# Patient Record
Sex: FEMALE | Race: WHITE | Employment: OTHER | URBAN - METROPOLITAN AREA
[De-identification: names, ages, dates, MRNs, and addresses within clinical notes are randomized per-mention and may not be internally consistent; named-entity substitution may affect disease eponyms.]

---

## 2020-01-25 ENCOUNTER — APPOINTMENT (OUTPATIENT)
Dept: CT IMAGING | Age: 85
DRG: 313 | End: 2020-01-25
Payer: MEDICARE

## 2020-01-25 ENCOUNTER — APPOINTMENT (OUTPATIENT)
Dept: GENERAL RADIOLOGY | Age: 85
DRG: 313 | End: 2020-01-25
Payer: MEDICARE

## 2020-01-25 ENCOUNTER — HOSPITAL ENCOUNTER (INPATIENT)
Age: 85
LOS: 3 days | Discharge: NURSING, REHAB OR LONG-TERM CARE | DRG: 313 | End: 2020-01-28
Attending: EMERGENCY MEDICINE | Admitting: INTERNAL MEDICINE
Payer: MEDICARE
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
## 2020-01-25 PROBLEM — R07.9 CHEST PAIN: Status: ACTIVE | Noted: 2020-01-25

## 2020-01-25 PROBLEM — I50.32 CHRONIC DIASTOLIC CONGESTIVE HEART FAILURE (HCC): Status: ACTIVE | Noted: 2020-01-25

## 2020-01-25 PROBLEM — R77.8 TROPONIN I ABOVE REFERENCE RANGE: Status: ACTIVE | Noted: 2020-01-25

## 2020-01-25 PROBLEM — E03.8 OTHER SPECIFIED HYPOTHYROIDISM: Status: ACTIVE | Noted: 2020-01-25

## 2020-01-25 LAB
ABSOLUTE EOS #: 0.13 K/UL (ref 0–0.44)
ABSOLUTE IMMATURE GRANULOCYTE: 0.09 K/UL (ref 0–0.3)
ABSOLUTE LYMPH #: 1.49 K/UL (ref 1.1–3.7)
ABSOLUTE MONO #: 0.81 K/UL (ref 0.1–1.2)
ANION GAP SERPL CALCULATED.3IONS-SCNC: 11 MMOL/L (ref 9–17)
BASOPHILS # BLD: 1 % (ref 0–2)
BASOPHILS ABSOLUTE: 0.05 K/UL (ref 0–0.2)
BNP INTERPRETATION: ABNORMAL
BUN BLDV-MCNC: 24 MG/DL (ref 8–23)
BUN/CREAT BLD: 21 (ref 9–20)
CALCIUM SERPL-MCNC: 9.7 MG/DL (ref 8.6–10.4)
CHLORIDE BLD-SCNC: 107 MMOL/L (ref 98–107)
CO2: 21 MMOL/L (ref 20–31)
CREAT SERPL-MCNC: 1.12 MG/DL (ref 0.5–0.9)
DIFFERENTIAL TYPE: ABNORMAL
EOSINOPHILS RELATIVE PERCENT: 1 % (ref 1–4)
GFR AFRICAN AMERICAN: 55 ML/MIN
GFR NON-AFRICAN AMERICAN: 46 ML/MIN
GFR SERPL CREATININE-BSD FRML MDRD: ABNORMAL ML/MIN/{1.73_M2}
GFR SERPL CREATININE-BSD FRML MDRD: ABNORMAL ML/MIN/{1.73_M2}
GLUCOSE BLD-MCNC: 118 MG/DL (ref 70–99)
HCT VFR BLD CALC: 40.7 % (ref 36.3–47.1)
HEMOGLOBIN: 12.1 G/DL (ref 11.9–15.1)
IMMATURE GRANULOCYTES: 1 %
LYMPHOCYTES # BLD: 14 % (ref 24–43)
MAGNESIUM: 2.1 MG/DL (ref 1.6–2.6)
MCH RBC QN AUTO: 28.2 PG (ref 25.2–33.5)
MCHC RBC AUTO-ENTMCNC: 29.7 G/DL (ref 28.4–34.8)
MCV RBC AUTO: 94.9 FL (ref 82.6–102.9)
MONOCYTES # BLD: 8 % (ref 3–12)
MYOGLOBIN: 277 NG/ML (ref 25–58)
MYOGLOBIN: 296 NG/ML (ref 25–58)
NRBC AUTOMATED: 0 PER 100 WBC
PDW BLD-RTO: 17 % (ref 11.8–14.4)
PLATELET # BLD: 136 K/UL (ref 138–453)
PLATELET ESTIMATE: ABNORMAL
PMV BLD AUTO: 11.7 FL (ref 8.1–13.5)
POTASSIUM SERPL-SCNC: 4.4 MMOL/L (ref 3.7–5.3)
PRO-BNP: 779 PG/ML
RBC # BLD: 4.29 M/UL (ref 3.95–5.11)
RBC # BLD: ABNORMAL 10*6/UL
SEG NEUTROPHILS: 75 % (ref 36–65)
SEGMENTED NEUTROPHILS ABSOLUTE COUNT: 7.92 K/UL (ref 1.5–8.1)
SODIUM BLD-SCNC: 139 MMOL/L (ref 135–144)
TOTAL CK: 103 U/L (ref 26–192)
TOTAL CK: 110 U/L (ref 26–192)
TROPONIN INTERP: ABNORMAL
TROPONIN T: ABNORMAL NG/ML
TROPONIN, HIGH SENSITIVITY: 30 NG/L (ref 0–14)
TROPONIN, HIGH SENSITIVITY: 30 NG/L (ref 0–14)
TROPONIN, HIGH SENSITIVITY: 31 NG/L (ref 0–14)
WBC # BLD: 10.5 K/UL (ref 3.5–11.3)
WBC # BLD: ABNORMAL 10*3/UL

## 2020-01-25 PROCEDURE — 6360000002 HC RX W HCPCS: Performed by: NURSE PRACTITIONER

## 2020-01-25 PROCEDURE — 96376 TX/PRO/DX INJ SAME DRUG ADON: CPT

## 2020-01-25 PROCEDURE — 6370000000 HC RX 637 (ALT 250 FOR IP): Performed by: NURSE PRACTITIONER

## 2020-01-25 PROCEDURE — 2580000003 HC RX 258: Performed by: NURSE PRACTITIONER

## 2020-01-25 PROCEDURE — 84484 ASSAY OF TROPONIN QUANT: CPT

## 2020-01-25 PROCEDURE — 1200000000 HC SEMI PRIVATE

## 2020-01-25 PROCEDURE — 71250 CT THORAX DX C-: CPT

## 2020-01-25 PROCEDURE — 72125 CT NECK SPINE W/O DYE: CPT

## 2020-01-25 PROCEDURE — 83874 ASSAY OF MYOGLOBIN: CPT

## 2020-01-25 PROCEDURE — 74176 CT ABD & PELVIS W/O CONTRAST: CPT

## 2020-01-25 PROCEDURE — 99222 1ST HOSP IP/OBS MODERATE 55: CPT | Performed by: NURSE PRACTITIONER

## 2020-01-25 PROCEDURE — 99291 CRITICAL CARE FIRST HOUR: CPT

## 2020-01-25 PROCEDURE — 71045 X-RAY EXAM CHEST 1 VIEW: CPT

## 2020-01-25 PROCEDURE — 82550 ASSAY OF CK (CPK): CPT

## 2020-01-25 PROCEDURE — 72170 X-RAY EXAM OF PELVIS: CPT

## 2020-01-25 PROCEDURE — 96374 THER/PROPH/DIAG INJ IV PUSH: CPT

## 2020-01-25 PROCEDURE — 93005 ELECTROCARDIOGRAM TRACING: CPT | Performed by: NURSE PRACTITIONER

## 2020-01-25 PROCEDURE — 36415 COLL VENOUS BLD VENIPUNCTURE: CPT

## 2020-01-25 PROCEDURE — 85025 COMPLETE CBC W/AUTO DIFF WBC: CPT

## 2020-01-25 PROCEDURE — 83735 ASSAY OF MAGNESIUM: CPT

## 2020-01-25 PROCEDURE — 70450 CT HEAD/BRAIN W/O DYE: CPT

## 2020-01-25 PROCEDURE — 83880 ASSAY OF NATRIURETIC PEPTIDE: CPT

## 2020-01-25 PROCEDURE — 80048 BASIC METABOLIC PNL TOTAL CA: CPT

## 2020-01-25 RX ORDER — MAGNESIUM SULFATE 1 G/100ML
1 INJECTION INTRAVENOUS PRN
Status: DISCONTINUED | OUTPATIENT
Start: 2020-01-25 | End: 2020-01-28 | Stop reason: HOSPADM

## 2020-01-25 RX ORDER — POTASSIUM CHLORIDE 7.45 MG/ML
10 INJECTION INTRAVENOUS PRN
Status: DISCONTINUED | OUTPATIENT
Start: 2020-01-25 | End: 2020-01-26 | Stop reason: SDUPTHER

## 2020-01-25 RX ORDER — ASPIRIN 81 MG/1
324 TABLET, CHEWABLE ORAL ONCE
Status: COMPLETED | OUTPATIENT
Start: 2020-01-25 | End: 2020-01-25

## 2020-01-25 RX ORDER — ONDANSETRON 2 MG/ML
4 INJECTION INTRAMUSCULAR; INTRAVENOUS EVERY 6 HOURS PRN
Status: DISCONTINUED | OUTPATIENT
Start: 2020-01-25 | End: 2020-01-26 | Stop reason: SDUPTHER

## 2020-01-25 RX ORDER — FENTANYL CITRATE 50 UG/ML
25 INJECTION, SOLUTION INTRAMUSCULAR; INTRAVENOUS ONCE
Status: COMPLETED | OUTPATIENT
Start: 2020-01-25 | End: 2020-01-25

## 2020-01-25 RX ORDER — DILTIAZEM HYDROCHLORIDE 120 MG/1
120 CAPSULE, EXTENDED RELEASE ORAL DAILY
Status: DISCONTINUED | OUTPATIENT
Start: 2020-01-26 | End: 2020-01-26 | Stop reason: CLARIF

## 2020-01-25 RX ORDER — ACETAMINOPHEN 325 MG/1
650 TABLET ORAL EVERY 4 HOURS PRN
Status: DISCONTINUED | OUTPATIENT
Start: 2020-01-25 | End: 2020-01-28 | Stop reason: HOSPADM

## 2020-01-25 RX ORDER — M-VIT,TX,IRON,MINS/CALC/FOLIC 27MG-0.4MG
1 TABLET ORAL DAILY
Status: DISCONTINUED | OUTPATIENT
Start: 2020-01-26 | End: 2020-01-28 | Stop reason: HOSPADM

## 2020-01-25 RX ORDER — LEVOTHYROXINE SODIUM 0.1 MG/1
100 TABLET ORAL DAILY
Status: DISCONTINUED | OUTPATIENT
Start: 2020-01-26 | End: 2020-01-28 | Stop reason: HOSPADM

## 2020-01-25 RX ORDER — SODIUM CHLORIDE 0.9 % (FLUSH) 0.9 %
10 SYRINGE (ML) INJECTION PRN
Status: DISCONTINUED | OUTPATIENT
Start: 2020-01-25 | End: 2020-01-28 | Stop reason: HOSPADM

## 2020-01-25 RX ORDER — POTASSIUM CHLORIDE 7.45 MG/ML
10 INJECTION INTRAVENOUS PRN
Status: DISCONTINUED | OUTPATIENT
Start: 2020-01-25 | End: 2020-01-28 | Stop reason: HOSPADM

## 2020-01-25 RX ORDER — ASPIRIN 81 MG/1
81 TABLET ORAL DAILY
Status: DISCONTINUED | OUTPATIENT
Start: 2020-01-26 | End: 2020-01-28 | Stop reason: HOSPADM

## 2020-01-25 RX ORDER — OXYBUTYNIN CHLORIDE 5 MG/1
5 TABLET ORAL 2 TIMES DAILY
Status: DISCONTINUED | OUTPATIENT
Start: 2020-01-25 | End: 2020-01-28 | Stop reason: HOSPADM

## 2020-01-25 RX ORDER — OXYCODONE HYDROCHLORIDE AND ACETAMINOPHEN 5; 325 MG/1; MG/1
1 TABLET ORAL EVERY 8 HOURS PRN
Status: ON HOLD | COMMUNITY
End: 2020-01-28 | Stop reason: SDUPTHER

## 2020-01-25 RX ORDER — PRAVASTATIN SODIUM 40 MG
40 TABLET ORAL DAILY
Status: DISCONTINUED | OUTPATIENT
Start: 2020-01-26 | End: 2020-01-28 | Stop reason: HOSPADM

## 2020-01-25 RX ORDER — DORZOLAMIDE HCL 20 MG/ML
1 SOLUTION/ DROPS OPHTHALMIC 3 TIMES DAILY
Status: DISCONTINUED | OUTPATIENT
Start: 2020-01-25 | End: 2020-01-28 | Stop reason: HOSPADM

## 2020-01-25 RX ORDER — 0.9 % SODIUM CHLORIDE 0.9 %
250 INTRAVENOUS SOLUTION INTRAVENOUS ONCE
Status: COMPLETED | OUTPATIENT
Start: 2020-01-25 | End: 2020-01-25

## 2020-01-25 RX ORDER — CALCIUM CARBONATE 500(1250)
600 TABLET ORAL DAILY
Status: DISCONTINUED | OUTPATIENT
Start: 2020-01-26 | End: 2020-01-26 | Stop reason: CLARIF

## 2020-01-25 RX ORDER — VITAMIN B COMPLEX
1000 TABLET ORAL DAILY
Status: DISCONTINUED | OUTPATIENT
Start: 2020-01-26 | End: 2020-01-28 | Stop reason: HOSPADM

## 2020-01-25 RX ORDER — POTASSIUM CHLORIDE 20 MEQ/1
20 TABLET, EXTENDED RELEASE ORAL
Status: DISCONTINUED | OUTPATIENT
Start: 2020-01-26 | End: 2020-01-28 | Stop reason: HOSPADM

## 2020-01-25 RX ORDER — SODIUM CHLORIDE 0.9 % (FLUSH) 0.9 %
10 SYRINGE (ML) INJECTION EVERY 12 HOURS SCHEDULED
Status: DISCONTINUED | OUTPATIENT
Start: 2020-01-25 | End: 2020-01-28 | Stop reason: HOSPADM

## 2020-01-25 RX ORDER — NITROGLYCERIN 0.4 MG/1
0.4 TABLET SUBLINGUAL EVERY 5 MIN PRN
Status: DISCONTINUED | OUTPATIENT
Start: 2020-01-25 | End: 2020-01-28 | Stop reason: HOSPADM

## 2020-01-25 RX ORDER — CLOPIDOGREL BISULFATE 75 MG/1
75 TABLET ORAL DAILY
Status: DISCONTINUED | OUTPATIENT
Start: 2020-01-26 | End: 2020-01-28 | Stop reason: HOSPADM

## 2020-01-25 RX ORDER — POTASSIUM CHLORIDE 20 MEQ/1
40 TABLET, EXTENDED RELEASE ORAL PRN
Status: DISCONTINUED | OUTPATIENT
Start: 2020-01-25 | End: 2020-01-28 | Stop reason: HOSPADM

## 2020-01-25 RX ORDER — PANTOPRAZOLE SODIUM 40 MG/1
40 TABLET, DELAYED RELEASE ORAL
Status: DISCONTINUED | OUTPATIENT
Start: 2020-01-26 | End: 2020-01-28 | Stop reason: HOSPADM

## 2020-01-25 RX ADMIN — ASPIRIN 81 MG 324 MG: 81 TABLET ORAL at 20:12

## 2020-01-25 RX ADMIN — FENTANYL CITRATE 25 MCG: 50 INJECTION, SOLUTION INTRAMUSCULAR; INTRAVENOUS at 23:11

## 2020-01-25 RX ADMIN — FENTANYL CITRATE 25 MCG: 50 INJECTION, SOLUTION INTRAMUSCULAR; INTRAVENOUS at 20:12

## 2020-01-25 RX ADMIN — Medication 10 ML: at 23:12

## 2020-01-25 RX ADMIN — FENTANYL CITRATE 25 MCG: 50 INJECTION, SOLUTION INTRAMUSCULAR; INTRAVENOUS at 19:32

## 2020-01-25 RX ADMIN — SODIUM CHLORIDE 250 ML: 9 INJECTION, SOLUTION INTRAVENOUS at 19:33

## 2020-01-25 ASSESSMENT — PAIN DESCRIPTION - PAIN TYPE
TYPE: ACUTE PAIN;CHRONIC PAIN
TYPE: ACUTE PAIN

## 2020-01-25 ASSESSMENT — PAIN DESCRIPTION - DESCRIPTORS
DESCRIPTORS: CONSTANT;DISCOMFORT
DESCRIPTORS: CONSTANT;DISCOMFORT

## 2020-01-25 ASSESSMENT — PAIN DESCRIPTION - LOCATION
LOCATION: CHEST;OTHER (COMMENT)
LOCATION: CHEST;BUTTOCKS

## 2020-01-25 ASSESSMENT — PAIN SCALES - GENERAL
PAINLEVEL_OUTOF10: 7
PAINLEVEL_OUTOF10: 10

## 2020-01-25 ASSESSMENT — PAIN DESCRIPTION - ONSET: ONSET: ON-GOING

## 2020-01-25 ASSESSMENT — PAIN DESCRIPTION - FREQUENCY
FREQUENCY: CONTINUOUS
FREQUENCY: CONTINUOUS

## 2020-01-25 ASSESSMENT — VISUAL ACUITY: OU: 1

## 2020-01-25 ASSESSMENT — ENCOUNTER SYMPTOMS
SHORTNESS OF BREATH: 0
ABDOMINAL PAIN: 0
BACK PAIN: 1
COUGH: 0
NAUSEA: 0

## 2020-01-25 ASSESSMENT — PAIN DESCRIPTION - ORIENTATION: ORIENTATION: LEFT;RIGHT

## 2020-01-25 ASSESSMENT — PAIN - FUNCTIONAL ASSESSMENT: PAIN_FUNCTIONAL_ASSESSMENT: PREVENTS OR INTERFERES WITH MANY ACTIVE NOT PASSIVE ACTIVITIES

## 2020-01-25 ASSESSMENT — PAIN DESCRIPTION - PROGRESSION: CLINICAL_PROGRESSION: NOT CHANGED

## 2020-01-26 PROBLEM — N30.01 ACUTE CYSTITIS WITH HEMATURIA: Status: ACTIVE | Noted: 2020-01-26

## 2020-01-26 PROBLEM — R07.89 ATYPICAL CHEST PAIN: Status: ACTIVE | Noted: 2020-01-25

## 2020-01-26 LAB
-: ABNORMAL
ALBUMIN SERPL-MCNC: 3.2 G/DL (ref 3.5–5.2)
ALBUMIN/GLOBULIN RATIO: ABNORMAL (ref 1–2.5)
ALP BLD-CCNC: 92 U/L (ref 35–104)
ALT SERPL-CCNC: 11 U/L (ref 5–33)
AMORPHOUS: ABNORMAL
ANION GAP SERPL CALCULATED.3IONS-SCNC: 12 MMOL/L (ref 9–17)
AST SERPL-CCNC: 19 U/L
BACTERIA: ABNORMAL
BILIRUB SERPL-MCNC: 0.53 MG/DL (ref 0.3–1.2)
BILIRUBIN URINE: NEGATIVE
BUN BLDV-MCNC: 23 MG/DL (ref 8–23)
BUN/CREAT BLD: 21 (ref 9–20)
CALCIUM SERPL-MCNC: 9.2 MG/DL (ref 8.6–10.4)
CASTS UA: ABNORMAL /LPF
CHLORIDE BLD-SCNC: 108 MMOL/L (ref 98–107)
CHOLESTEROL/HDL RATIO: 3.9
CHOLESTEROL: 206 MG/DL
CO2: 19 MMOL/L (ref 20–31)
COLOR: YELLOW
COMMENT UA: ABNORMAL
CREAT SERPL-MCNC: 1.09 MG/DL (ref 0.5–0.9)
CRYSTALS, UA: ABNORMAL /HPF
EPITHELIAL CELLS UA: ABNORMAL /HPF (ref 0–5)
GFR AFRICAN AMERICAN: 57 ML/MIN
GFR NON-AFRICAN AMERICAN: 47 ML/MIN
GFR SERPL CREATININE-BSD FRML MDRD: ABNORMAL ML/MIN/{1.73_M2}
GFR SERPL CREATININE-BSD FRML MDRD: ABNORMAL ML/MIN/{1.73_M2}
GLUCOSE BLD-MCNC: 105 MG/DL (ref 70–99)
GLUCOSE URINE: NEGATIVE
HCT VFR BLD CALC: 38.2 % (ref 36.3–47.1)
HDLC SERPL-MCNC: 53 MG/DL
HEMOGLOBIN: 11.5 G/DL (ref 11.9–15.1)
KETONES, URINE: NEGATIVE
LDL CHOLESTEROL: 129 MG/DL (ref 0–130)
LEUKOCYTE ESTERASE, URINE: ABNORMAL
MAGNESIUM: 2.1 MG/DL (ref 1.6–2.6)
MCH RBC QN AUTO: 28.3 PG (ref 25.2–33.5)
MCHC RBC AUTO-ENTMCNC: 30.1 G/DL (ref 28.4–34.8)
MCV RBC AUTO: 94.1 FL (ref 82.6–102.9)
MUCUS: ABNORMAL
NITRITE, URINE: POSITIVE
NRBC AUTOMATED: 0 PER 100 WBC
OTHER OBSERVATIONS UA: ABNORMAL
PDW BLD-RTO: 17 % (ref 11.8–14.4)
PH UA: 6.5 (ref 5–8)
PLATELET # BLD: 129 K/UL (ref 138–453)
PMV BLD AUTO: 12.3 FL (ref 8.1–13.5)
POTASSIUM SERPL-SCNC: 4.3 MMOL/L (ref 3.7–5.3)
PROTEIN UA: ABNORMAL
RBC # BLD: 4.06 M/UL (ref 3.95–5.11)
RBC UA: ABNORMAL /HPF (ref 0–2)
RENAL EPITHELIAL, UA: ABNORMAL /HPF
SODIUM BLD-SCNC: 139 MMOL/L (ref 135–144)
SPECIFIC GRAVITY UA: 1.02 (ref 1–1.03)
TOTAL PROTEIN: 6.9 G/DL (ref 6.4–8.3)
TRICHOMONAS: ABNORMAL
TRIGL SERPL-MCNC: 120 MG/DL
TROPONIN INTERP: ABNORMAL
TROPONIN T: ABNORMAL NG/ML
TROPONIN, HIGH SENSITIVITY: 32 NG/L (ref 0–14)
TURBIDITY: ABNORMAL
URINE HGB: ABNORMAL
UROBILINOGEN, URINE: NORMAL
VLDLC SERPL CALC-MCNC: ABNORMAL MG/DL (ref 1–30)
WBC # BLD: 7.6 K/UL (ref 3.5–11.3)
WBC UA: ABNORMAL /HPF (ref 0–5)
YEAST: ABNORMAL

## 2020-01-26 PROCEDURE — 80053 COMPREHEN METABOLIC PANEL: CPT

## 2020-01-26 PROCEDURE — 1200000000 HC SEMI PRIVATE

## 2020-01-26 PROCEDURE — 2580000003 HC RX 258: Performed by: NURSE PRACTITIONER

## 2020-01-26 PROCEDURE — 99232 SBSQ HOSP IP/OBS MODERATE 35: CPT | Performed by: INTERNAL MEDICINE

## 2020-01-26 PROCEDURE — 81001 URINALYSIS AUTO W/SCOPE: CPT

## 2020-01-26 PROCEDURE — 93005 ELECTROCARDIOGRAM TRACING: CPT | Performed by: NURSE PRACTITIONER

## 2020-01-26 PROCEDURE — 84484 ASSAY OF TROPONIN QUANT: CPT

## 2020-01-26 PROCEDURE — 87086 URINE CULTURE/COLONY COUNT: CPT

## 2020-01-26 PROCEDURE — 87186 SC STD MICRODIL/AGAR DIL: CPT

## 2020-01-26 PROCEDURE — 94761 N-INVAS EAR/PLS OXIMETRY MLT: CPT

## 2020-01-26 PROCEDURE — 36415 COLL VENOUS BLD VENIPUNCTURE: CPT

## 2020-01-26 PROCEDURE — 6370000000 HC RX 637 (ALT 250 FOR IP): Performed by: NURSE PRACTITIONER

## 2020-01-26 PROCEDURE — 6360000002 HC RX W HCPCS: Performed by: NURSE PRACTITIONER

## 2020-01-26 PROCEDURE — 2700000000 HC OXYGEN THERAPY PER DAY

## 2020-01-26 PROCEDURE — 83735 ASSAY OF MAGNESIUM: CPT

## 2020-01-26 PROCEDURE — 80061 LIPID PANEL: CPT

## 2020-01-26 PROCEDURE — 6370000000 HC RX 637 (ALT 250 FOR IP): Performed by: INTERNAL MEDICINE

## 2020-01-26 PROCEDURE — 85027 COMPLETE CBC AUTOMATED: CPT

## 2020-01-26 PROCEDURE — 2500000003 HC RX 250 WO HCPCS: Performed by: NURSE PRACTITIONER

## 2020-01-26 PROCEDURE — 87077 CULTURE AEROBIC IDENTIFY: CPT

## 2020-01-26 RX ORDER — DILTIAZEM HYDROCHLORIDE 120 MG/1
120 CAPSULE, COATED, EXTENDED RELEASE ORAL DAILY
Status: DISCONTINUED | OUTPATIENT
Start: 2020-01-26 | End: 2020-01-28 | Stop reason: HOSPADM

## 2020-01-26 RX ORDER — ONDANSETRON 4 MG/1
4 TABLET, ORALLY DISINTEGRATING ORAL EVERY 6 HOURS PRN
Status: DISCONTINUED | OUTPATIENT
Start: 2020-01-26 | End: 2020-01-28 | Stop reason: HOSPADM

## 2020-01-26 RX ORDER — CEFTRIAXONE 1 G/1
1 INJECTION, POWDER, FOR SOLUTION INTRAMUSCULAR; INTRAVENOUS EVERY 24 HOURS
Status: DISCONTINUED | OUTPATIENT
Start: 2020-01-26 | End: 2020-01-26 | Stop reason: SDUPTHER

## 2020-01-26 RX ORDER — CALCIUM CARBONATE 200(500)MG
500 TABLET,CHEWABLE ORAL DAILY
Status: DISCONTINUED | OUTPATIENT
Start: 2020-01-26 | End: 2020-01-28 | Stop reason: HOSPADM

## 2020-01-26 RX ORDER — ONDANSETRON 2 MG/ML
4 INJECTION INTRAMUSCULAR; INTRAVENOUS EVERY 6 HOURS PRN
Status: DISCONTINUED | OUTPATIENT
Start: 2020-01-26 | End: 2020-01-28 | Stop reason: HOSPADM

## 2020-01-26 RX ORDER — OXYCODONE HYDROCHLORIDE AND ACETAMINOPHEN 5; 325 MG/1; MG/1
1 TABLET ORAL EVERY 8 HOURS PRN
Status: DISCONTINUED | OUTPATIENT
Start: 2020-01-26 | End: 2020-01-27

## 2020-01-26 RX ADMIN — ANTI-FUNGAL POWDER MICONAZOLE NITRATE TALC FREE: 1.42 POWDER TOPICAL at 20:17

## 2020-01-26 RX ADMIN — Medication 10 ML: at 09:22

## 2020-01-26 RX ADMIN — CLOPIDOGREL BISULFATE 75 MG: 75 TABLET ORAL at 09:16

## 2020-01-26 RX ADMIN — LEVOTHYROXINE SODIUM 100 MCG: 100 TABLET ORAL at 05:42

## 2020-01-26 RX ADMIN — OXYCODONE HYDROCHLORIDE AND ACETAMINOPHEN 1 TABLET: 5; 325 TABLET ORAL at 18:40

## 2020-01-26 RX ADMIN — POTASSIUM CHLORIDE 20 MEQ: 20 TABLET, EXTENDED RELEASE ORAL at 08:21

## 2020-01-26 RX ADMIN — PANTOPRAZOLE SODIUM 40 MG: 40 TABLET, DELAYED RELEASE ORAL at 05:42

## 2020-01-26 RX ADMIN — OXYCODONE HYDROCHLORIDE AND ACETAMINOPHEN 1 TABLET: 5; 325 TABLET ORAL at 09:13

## 2020-01-26 RX ADMIN — ENOXAPARIN SODIUM 40 MG: 40 INJECTION SUBCUTANEOUS at 09:16

## 2020-01-26 RX ADMIN — ANTACID TABLETS 500 MG: 500 TABLET, CHEWABLE ORAL at 12:40

## 2020-01-26 RX ADMIN — MULTIPLE VITAMINS W/ MINERALS TAB 1 TABLET: TAB at 18:41

## 2020-01-26 RX ADMIN — VITAMIN D, TAB 1000IU (100/BT) 1000 UNITS: 25 TAB at 09:16

## 2020-01-26 RX ADMIN — CEFTRIAXONE SODIUM 1 G: 1 INJECTION, POWDER, FOR SOLUTION INTRAMUSCULAR; INTRAVENOUS at 08:20

## 2020-01-26 RX ADMIN — DILTIAZEM HYDROCHLORIDE 120 MG: 120 CAPSULE, COATED, EXTENDED RELEASE ORAL at 09:16

## 2020-01-26 RX ADMIN — PRAVASTATIN SODIUM 40 MG: 40 TABLET ORAL at 09:16

## 2020-01-26 RX ADMIN — DORZOLAMIDE HYDROCHLORIDE 1 DROP: 20 SOLUTION/ DROPS OPHTHALMIC at 09:28

## 2020-01-26 RX ADMIN — ANTI-FUNGAL POWDER MICONAZOLE NITRATE TALC FREE: 1.42 POWDER TOPICAL at 09:28

## 2020-01-26 RX ADMIN — Medication 10 ML: at 20:18

## 2020-01-26 RX ADMIN — DORZOLAMIDE HYDROCHLORIDE 1 DROP: 20 SOLUTION/ DROPS OPHTHALMIC at 16:23

## 2020-01-26 RX ADMIN — ASPIRIN 81 MG: 81 TABLET, COATED ORAL at 09:16

## 2020-01-26 RX ADMIN — DORZOLAMIDE HYDROCHLORIDE 1 DROP: 20 SOLUTION/ DROPS OPHTHALMIC at 20:17

## 2020-01-26 RX ADMIN — OXYCODONE HYDROCHLORIDE AND ACETAMINOPHEN 1 TABLET: 5; 325 TABLET ORAL at 00:46

## 2020-01-26 ASSESSMENT — ENCOUNTER SYMPTOMS
NAUSEA: 0
BLOOD IN STOOL: 0
BACK PAIN: 1
CONSTIPATION: 0
STRIDOR: 0
ABDOMINAL PAIN: 0
WHEEZING: 0
DIARRHEA: 0
SHORTNESS OF BREATH: 0
COUGH: 0
VOMITING: 0

## 2020-01-26 ASSESSMENT — PAIN - FUNCTIONAL ASSESSMENT: PAIN_FUNCTIONAL_ASSESSMENT: PREVENTS OR INTERFERES WITH MANY ACTIVE NOT PASSIVE ACTIVITIES

## 2020-01-26 ASSESSMENT — PAIN DESCRIPTION - FREQUENCY: FREQUENCY: CONTINUOUS

## 2020-01-26 ASSESSMENT — PAIN DESCRIPTION - DESCRIPTORS: DESCRIPTORS: CONSTANT;DISCOMFORT

## 2020-01-26 ASSESSMENT — PAIN SCALES - GENERAL
PAINLEVEL_OUTOF10: 10
PAINLEVEL_OUTOF10: 9
PAINLEVEL_OUTOF10: 5
PAINLEVEL_OUTOF10: 10

## 2020-01-26 ASSESSMENT — PAIN DESCRIPTION - LOCATION: LOCATION: CHEST;BUTTOCKS

## 2020-01-26 ASSESSMENT — PAIN DESCRIPTION - PROGRESSION: CLINICAL_PROGRESSION: NOT CHANGED

## 2020-01-26 ASSESSMENT — PAIN DESCRIPTION - PAIN TYPE: TYPE: ACUTE PAIN;CHRONIC PAIN

## 2020-01-26 ASSESSMENT — PAIN DESCRIPTION - ONSET: ONSET: ON-GOING

## 2020-01-26 NOTE — PROGRESS NOTES
 Methenamine     Motrin [Ibuprofen]     Nsaids     Pseudoephedrine Hcl     Zestril [Lisinopril]        Current Meds:   Scheduled Meds:    diltiazem  120 mg Oral Daily    calcium carbonate  500 mg Oral Daily    miconazole   Topical BID    cefTRIAXone (ROCEPHIN) IV  1 g Intravenous Q24H    aspirin  81 mg Oral Daily    clopidogrel  75 mg Oral Daily    dorzolamide  1 drop Both Eyes TID    levothyroxine  100 mcg Oral Daily    therapeutic multivitamin-minerals  1 tablet Oral Daily    pantoprazole  40 mg Oral QAM AC    oxybutynin  5 mg Oral BID    potassium chloride  20 mEq Oral Daily with breakfast    pravastatin  40 mg Oral Daily    Vitamin D  1,000 Units Oral Daily    sodium chloride flush  10 mL Intravenous 2 times per day    enoxaparin  40 mg Subcutaneous Daily     Continuous Infusions:   PRN Meds: oxyCODONE-acetaminophen, ondansetron **OR** ondansetron, sodium chloride flush, potassium chloride **OR** potassium alternative oral replacement **OR** potassium chloride, magnesium sulfate, magnesium hydroxide, nitroGLYCERIN, acetaminophen    Data:     Past Medical History:   has a past medical history of Smallwood's esophagus, Chronic diastolic congestive heart failure (HCC), Chronic kidney disease, Essential hypertension, Essential hypertension, Gout, Hypertension, Membranous glomerulonephritis, Osteoarthritis, Proteinuria, and Unspecified sleep apnea. Social History:   reports that she has never smoked. She has never used smokeless tobacco. She reports that she does not drink alcohol or use drugs.      Family History:   Family History   Problem Relation Age of Onset    Heart Disease Father     Cancer Paternal Grandmother        Vitals:  BP (!) 130/52   Pulse 73   Temp 97.3 °F (36.3 °C) (Oral)   Resp 20   Ht 5' 4\" (1.626 m)   Wt 185 lb 8 oz (84.1 kg)   SpO2 94%   BMI 31.84 kg/m²   Temp (24hrs), Av.8 °F (36.6 °C), Min:97.3 °F (36.3 °C), Max:98.6 °F (37 °C)    No results for input(s): Contrast    Result Date: 1/25/2020  Interval increase in the interstitial lung disease and fibrosis. Patchy nonspecific ground-glass infiltrates. Differential considerations include infectious, inflammatory, and neoplastic etiologies. Follow-up recommended. Mild compression fracture upper thoracic spine, age indeterminate. Ct Cervical Spine Wo Contrast    Result Date: 1/25/2020  Chronic small vessel ischemic disease. Remote encephalomalacia right occipital lobe. Moderate To severe degenerate changes cervical spine. No acute fracture traumatic malalignment. Xr Chest Portable    Result Date: 1/25/2020  Mild edema versus interstitial lung disease. Physical Examination:        General appearance:  alert, cooperative and no distress  Mental Status:  oriented to person, place and time and anxious affect  Lungs:  + Reproducible chest wall tenderness on pressing, diminished breath sounds at bases, no Rales no wheezing  Heart:  regular rate and rhythm, no murmur  Abdomen:  soft, nontender, nondistended, normal bowel sounds, no masses, hepatomegaly, splenomegaly  Extremities: Edema edema, redness, tenderness in the calves  Skin:  no gross lesions, rashes, induration    Assessment:        Hospital Problems           Last Modified POA    * (Principal) Atypical chest pain 1/26/2020 Yes    Acute cystitis with hematuria 1/26/2020 Yes    CKD (chronic kidney disease) stage 3, GFR 30-59 ml/min ([address] 1/25/2020 Yes    Fall at home, initial encounter 1/25/2020 Yes    Smallwood's esophagus (Chronic) 1/25/2020 Yes    Essential hypertension 1/25/2020 Yes    Troponin I above reference range 1/25/2020 Yes    Chronic diastolic congestive heart failure ([address] 1/25/2020 Yes    Other specified hypothyroidism 1/25/2020 Yes    Cardiac enzymes elevated 1/25/2020 Yes          Plan:        1. Follow serial cardiac exam results, mild rise appears to be due to chronic kidney disease  2. Pain control  3.  Send urine culture stat and

## 2020-01-26 NOTE — CARE COORDINATION
Case Management Initial Discharge Plan  [address],         Readmission Risk              Risk of Unplanned Readmission:        15             Met with:patient to discuss discharge plans. Information verified: address, contacts, phone number, , insurance Yes  PCP: Lena Pelayo  Date of last visit: Ganesh Provider: none listed    Discharge Planning  Current Residence:     Living Arrangements:  Spouse/Significant Other   Home has 2 stories/14 stairs to climb  Support Systems:  Family Members, Spouse/Significant Other  Current Services PTA:    Supplier: none  Patient able to perform ADL's:Assisted  DME used to aid ambulation prior to admission: walker/during admissionwalker    Potential Assistance Needed:  Extended [address]: [facility] [employer]   Potential Assistance Purchasing Medications:  No  Does patient want to participate in local refill/ meds to beds program?  No    Patient agreeable to home care: TBD  Railroad of choice provided:  n/a      Type of Home Care Services:  None  Patient expects to be discharged to:  home    Prior SNF/Rehab Placement and Facility: yes, she does not remember which facility  Agreeable to SNF/Rehab: No  Railroad of choice provided: n/a   Evaluation: yes    Expected Discharge date:  20  Follow Up Appointment: Best Day/ Time:      Transportation provider: BESS  Transportation arrangements needed for discharge: TBD    Discharge Plan: Met with patient to discuss dc options. Patient lives with  in 2 story home. There are 14 steps to her bedroom and bathroom. She states that she has crutches,cane,walker, elevated toilet seat, grab bars in shower, shower seat. She states that she had a recent fall and has been using walker at home. Patient may benefit from PT/OT to determine safety for return home. Patient would like to return home with . [address]        Electronically signed by BELTRAN Prince on 20 at 8:48 AM

## 2020-01-26 NOTE — PROGRESS NOTES
Pt.'s son would like to be contacted about any concerns d/t pt and pt's  are forgetful : Jim Persaud [phone].

## 2020-01-26 NOTE — FLOWSHEET NOTE
Patient + son  Sd was present. Patient passive, states well, treated well. No major needs or prayers expressed. States ACP, POA is in effect, son Sd Flores is POA   leaves prayer card for possible follow up.     01/26/20 9387   Encounter Summary   Services provided to: Patient and family together   Referral/Consult From: [address]; Children   Place [facility] No   Continue Visiting   (1-26-20)   Complexity of Encounter Low   Length of Encounter 15 minutes   Spiritual Assessment Completed Yes   Advance Care Planning Yes   Routine   Type Initial   Assessment Passive; Approachable;Calm   Intervention Explored feelings, thoughts, concerns; Discussed illness/injury and it's impact; Discussed belief system/Hoahaoism practices/enzo   Outcome Expressed gratitude   Advance Directives (For Healthcare)   Healthcare Directive Yes, patient has an advance directive for healthcare treatment   Type of Healthcare Directive Durable power of  for health care   Healthcare Agent Appointed [address] Agent's Name   Radha Shukla )

## 2020-01-26 NOTE — ED NOTES
Pt presents to the ED via private auto for chest pain and tailbone pain. Pt states she was walking with her walker and fell backwards. Pt states she thinks her walker got caught on something. Pt denies LOC. Pt states her tailbone hurts and her chest hurts. On palpation pt states chest hurts. Pt rates pain a 7/10. Pt is unable to ambulate after fall.       Aby Ochoa RN  01/25/20 2021

## 2020-01-26 NOTE — PLAN OF CARE
Problem: Falls - Risk of:  Goal: Will remain free from falls  Description  Will remain free from falls  Outcome: Ongoing     Problem: Falls - Risk of:  Goal: Absence of physical injury  Description  Absence of physical injury  Outcome: Ongoing     Problem: Risk for Impaired Skin Integrity  Goal: Tissue integrity - skin and mucous membranes  Description  Structural intactness and normal physiological function of skin and  mucous membranes.   Outcome: Ongoing     Problem: Pain:  Goal: Pain level will decrease  Description  Pain level will decrease  Outcome: Ongoing     Problem: Pain:  Goal: Control of acute pain  Description  Control of acute pain  Outcome: Ongoing     Problem: Pain:  Goal: Control of chronic pain  Description  Control of chronic pain  Outcome: Ongoing     Problem: ABCDS Injury Assessment  Goal: Absence of physical injury  Outcome: Ongoing

## 2020-01-26 NOTE — ED PROVIDER NOTES
[facility] ED  EMERGENCY DEPARTMENT ENCOUNTER      Pt Name: Gina Brandt  MRN: [id number]  [place] 4/1/1928  Date of evaluation: 1/25/2020  Provider: Radha Carranza       Chief Complaint   Patient presents with    Chest Pain     chest wall pain after fall/ no chest pain prior to fall/ bilateral chest pain    Fall     walking with walker/ denies loss of consciousness    Tailbone Pain         HISTORY OFPRESENT ILLNESS  (Location/Symptom, Timing/Onset, Context/Setting, Quality, Duration, Modifying Factors, Severity.)   Gina Brandt is a 80 y.o. female who presents to the emergency department by EMS for evaluation of chest pain and tailbone pain after she fell in her living room she was walking using her walker today. Patient states she thinks her walker got caught on the rug and she fell backwards. Denies hitting her head but states she landed on her back. She has been unable to ambulate since the fall. Patient states her chest pain started after she fell although she denies hitting her chest on anything. Pain is a 7 out of 10. Nursing Notes were reviewed. PASTMEDICAL HISTORY     Past Medical History:   Diagnosis Date    Smallwood's esophagus     Chronic kidney disease     Gout     Hypertension     Membranous glomerulonephritis     Osteoarthritis     Proteinuria     Unspecified sleep apnea          SURGICAL HISTORY       Past Surgical History:   Procedure Laterality Date    APPENDECTOMY      JOINT REPLACEMENT      TONSILLECTOMY           CURRENT MEDICATIONS     Previous Medications    ASPIRIN 81 MG TABLET    Take 81 mg by mouth daily    CALCIUM CARBONATE (OSCAL) 500 MG TABS TABLET    Take 600 mg by mouth daily    CEPHALEXIN (KEFLEX PO)    Take 500 mg by mouth 3 times daily For 7 days starting 3/21/17    CLOPIDOGREL (PLAVIX) 75 MG TABLET    Take 75 mg by mouth daily.     DENOSUMAB (PROLIA) 60 MG/ML SOLN SC INJECTION    Inject 60 mg into the skin once    DILTIAZEM (TIAZAC) 120 MG SR CAPSULE    Take 120 mg by mouth daily. DORZOLAMIDE (TRUSOPT) 2 % OPHTHALMIC SOLUTION    Place 1 drop into both eyes 3 times daily. FUROSEMIDE (LASIX) 80 MG TABLET    take 1 tablet by mouth once daily    LEVOTHYROXINE (SYNTHROID) 100 MCG TABLET    Take 100 mcg by mouth Daily. MULTIPLE VITAMINS-MINERALS (THERAPEUTIC MULTIVITAMIN-MINERALS) TABLET    Take 1 tablet by mouth daily. OMEPRAZOLE (PRILOSEC) 20 MG CAPSULE    Take 20 mg by mouth 2 times daily. ONDANSETRON (ZOFRAN-ODT) 4 MG DISINTEGRATING TABLET    Take 4 mg by mouth 2 times daily as needed for Nausea or Vomiting. OXYBUTYNIN (DITROPAN) 5 MG TABLET    Take 5 mg by mouth 2 times daily. OXYCODONE-ACETAMINOPHEN (PERCOCET)  MG PER TABLET    Take 1 tablet by mouth every 4 hours as needed for Pain    POTASSIUM CHLORIDE (KLOR-CON M) 20 MEQ EXTENDED RELEASE TABLET    take 1 tablet by mouth once daily    PRAVASTATIN (PRAVACHOL) 40 MG TABLET    Take 40 mg by mouth daily. VITAMIN D (CHOLECALCIFEROL) 1000 UNIT TABS TABLET    Take 1,000 Units by mouth daily       ALLERGIES     Codeine; Ezetimibe; Methenamine; Motrin [ibuprofen];  Nsaids; Pseudoephedrine hcl; and Zestril [lisinopril]    FAMILY HISTORY       Family History   Problem Relation Age of Onset    Heart Disease Father     Cancer Paternal Grandmother           SOCIAL HISTORY       Social History     Socioeconomic History    Marital status:      Spouse name: None    Number of children: None    Years of education: None    Highest education level: None   Occupational History    None   Social Needs    Financial resource strain: None    Food insecurity:     Worry: None     Inability: None    Transportation needs:     Medical: None     Non-medical: None   Tobacco Use    Smoking status: Never Smoker    Smokeless tobacco: Never Used   Substance and Sexual Activity    Alcohol use: No    Drug use: No    Sexual activity: None   Lifestyle    Physical activity: physician with the below findings:    Ct Abdomen Pelvis Wo Contrast Additional Contrast? None    Result Date: 1/25/2020  EXAMINATION: CT OF THE ABDOMEN AND PELVIS WITHOUT CONTRAST 1/25/2020 7:27 pm TECHNIQUE: CT of the abdomen and pelvis was performed without the administration of intravenous contrast. Multiplanar reformatted images are provided for review. Dose modulation, iterative reconstruction, and/or weight based adjustment of the mA/kV was utilized to reduce the radiation dose to as low as reasonably achievable. COMPARISON: None. HISTORY: ORDERING SYSTEM PROVIDED HISTORY: pain, fall TECHNOLOGIST PROVIDED HISTORY: pain, fall FINDINGS: Lower Chest: Cardiomegaly and coarse calcification mitral annulus. Organs: Ventral hernia. The liver, spleen, pancreas, and adrenals appear normal.  Gallbladder surgically absent. Kidneys appear normal. The bladder appears normal. GI/Bowel: Small bowel normal.  Sigmoid diverticulosis and increased stool throughout the colon. Right colon ectomy. Moderate hiatal hernia. Pelvis: Uterus normal. Peritoneum/Retroperitoneum: Calcified plaque along the aorta and its branches. Bones/Soft Tissues: Bilateral total hip arthroplasties. Moderate levoconvex scoliosis. Diffuse demineralization. No acute traumatic sequelae. Incidental findings as above. Xr Pelvis (1-2 Views)    Result Date: 1/25/2020  EXAMINATION: ONE XRAY VIEW OF THE PELVIS 1/25/2020 7:56 pm COMPARISON: January 20, 2012 HISTORY: ORDERING SYSTEM PROVIDED HISTORY: pain, fall TECHNOLOGIST PROVIDED HISTORY: pain, fall Reason for Exam: fall Acuity: Acute Type of Exam: Initial FINDINGS: Pelvic ring intact. New bilateral total hip arthroplasty is in anatomic alignment. Diffuse demineralization decreases sensitivity but no fractures noted. Soft tissues unremarkable. No fracture identified but sensitivity limited due to demineralization.      Ct Head Wo Contrast    Result Date: 1/25/2020  EXAMINATION: CT OF THE HEAD TISSUES: No prevertebral soft tissue swelling. Patchy interstitial thickening identified involving both lung apices noted. Chronic small vessel ischemic disease. Remote encephalomalacia right occipital lobe. Moderate To severe degenerate changes cervical spine. No acute fracture traumatic malalignment. Ct Chest Wo Contrast    Result Date: 1/25/2020  EXAMINATION: CT OF THE CHEST WITHOUT CONTRAST 1/25/2020 7:27 pm TECHNIQUE: CT of the chest was performed without the administration of intravenous contrast. Multiplanar reformatted images are provided for review. Dose modulation, iterative reconstruction, and/or weight based adjustment of the mA/kV was utilized to reduce the radiation dose to as low as reasonably achievable. COMPARISON: July 27, 2006 CT chest HISTORY: ORDERING SYSTEM PROVIDED HISTORY: Chest Pain, fall TECHNOLOGIST PROVIDED HISTORY: Chest Pain, fall FINDINGS: Mediastinum: Calcific coronary artery disease and coarse calcification of the mitral annulus. Borderline nonspecific mediastinal lymph nodes measuring up to 14 mm. The heart and great vessels are otherwise unremarkable. No significant hilar mass. Lungs/pleura: Bilateral patchy ground-glass interstitial infiltrates. Septal thickening noted throughout. Upper Abdomen: Moderate hiatal hernia. Soft Tissues/Bones: Moderately severe kyphosis. Slight anterior wedging midthoracic vertebral body. Old sternal fracture. Interval increase in the interstitial lung disease and fibrosis. Patchy nonspecific ground-glass infiltrates. Differential considerations include infectious, inflammatory, and neoplastic etiologies. Follow-up recommended. Mild compression fracture upper thoracic spine, age indeterminate.      Ct Cervical Spine Wo Contrast    Result Date: 1/25/2020  EXAMINATION: CT OF THE HEAD WITHOUT CONTRAST; CT OF THE CERVICAL SPINE WITHOUT CONTRAST 1/25/2020 7:27 pm TECHNIQUE: CT of the head was performed without the administration of condition she required highest level of my preparedness to intervene urgently. I provided critical care time including documentation time, medication orders and management, reevaluation, vital sign assessment, ordering and reviewing of of lab tests ordering and reviewing of x-ray studies, and admission orders. Aggregate critical care time is 35 minutes including only time during which I was engaged in work directly related to her care and did not include time spent treating other patients simultaneously. Vitals:    Vitals:    01/25/20 1849 01/25/20 1852 01/25/20 1906 01/25/20 2046   BP:  128/62 (!) 108/54 (!) 143/77   Pulse: 101  98 93   Resp: 17  21 22   Temp: 98.6 °F (37 °C) 97.6 °F (36.4 °C)     TempSrc: Oral Oral     SpO2: 92%   95%   Weight: 190 lb (86.2 kg)            CONSULTS:  IP CONSULT TO INTERNAL MEDICINE    RES:  Procedures    FINAL IMPRESSION      1. Chest pain, unspecified type    2. Fall, initial encounter    3. Cardiac enzymes elevated          DISPOSITION/PLAN   DISPOSITION Decision To Admit 01/25/2020 08:55:50 PM      PATIENT REFERRED TO:   No follow-up provider specified.     DISCHARGE MEDICATIONS:     New Prescriptions    No medications on file     Electronically signed by LYDIA Álvarez 1/25/2020 at 9:41 PM            LYDIA Álvarez CNP  01/25/20 3974

## 2020-01-27 LAB
ANION GAP SERPL CALCULATED.3IONS-SCNC: 8 MMOL/L (ref 9–17)
BUN BLDV-MCNC: 26 MG/DL (ref 8–23)
BUN/CREAT BLD: 20 (ref 9–20)
CALCIUM SERPL-MCNC: 8.8 MG/DL (ref 8.6–10.4)
CHLORIDE BLD-SCNC: 107 MMOL/L (ref 98–107)
CO2: 22 MMOL/L (ref 20–31)
CREAT SERPL-MCNC: 1.3 MG/DL (ref 0.5–0.9)
EKG ATRIAL RATE: 72 BPM
EKG ATRIAL RATE: 97 BPM
EKG Q-T INTERVAL: 352 MS
EKG Q-T INTERVAL: 408 MS
EKG QRS DURATION: 70 MS
EKG QRS DURATION: 76 MS
EKG QTC CALCULATION (BAZETT): 452 MS
EKG QTC CALCULATION (BAZETT): 454 MS
EKG R AXIS: -2 DEGREES
EKG R AXIS: -5 DEGREES
EKG T AXIS: 30 DEGREES
EKG T AXIS: 59 DEGREES
EKG VENTRICULAR RATE: 100 BPM
EKG VENTRICULAR RATE: 74 BPM
GFR AFRICAN AMERICAN: 47 ML/MIN
GFR NON-AFRICAN AMERICAN: 38 ML/MIN
GFR SERPL CREATININE-BSD FRML MDRD: ABNORMAL ML/MIN/{1.73_M2}
GFR SERPL CREATININE-BSD FRML MDRD: ABNORMAL ML/MIN/{1.73_M2}
GLUCOSE BLD-MCNC: 105 MG/DL (ref 70–99)
LV EF: 65 %
LVEF MODALITY: NORMAL
POTASSIUM SERPL-SCNC: 4.6 MMOL/L (ref 3.7–5.3)
SODIUM BLD-SCNC: 137 MMOL/L (ref 135–144)

## 2020-01-27 PROCEDURE — 99232 SBSQ HOSP IP/OBS MODERATE 35: CPT | Performed by: FAMILY MEDICINE

## 2020-01-27 PROCEDURE — 6370000000 HC RX 637 (ALT 250 FOR IP): Performed by: INTERNAL MEDICINE

## 2020-01-27 PROCEDURE — 6370000000 HC RX 637 (ALT 250 FOR IP): Performed by: NURSE PRACTITIONER

## 2020-01-27 PROCEDURE — 97530 THERAPEUTIC ACTIVITIES: CPT

## 2020-01-27 PROCEDURE — 97110 THERAPEUTIC EXERCISES: CPT

## 2020-01-27 PROCEDURE — 6360000002 HC RX W HCPCS: Performed by: NURSE PRACTITIONER

## 2020-01-27 PROCEDURE — 93010 ELECTROCARDIOGRAM REPORT: CPT | Performed by: INTERNAL MEDICINE

## 2020-01-27 PROCEDURE — 36415 COLL VENOUS BLD VENIPUNCTURE: CPT

## 2020-01-27 PROCEDURE — 80048 BASIC METABOLIC PNL TOTAL CA: CPT

## 2020-01-27 PROCEDURE — 2580000003 HC RX 258: Performed by: NURSE PRACTITIONER

## 2020-01-27 PROCEDURE — 2700000000 HC OXYGEN THERAPY PER DAY

## 2020-01-27 PROCEDURE — 97166 OT EVAL MOD COMPLEX 45 MIN: CPT

## 2020-01-27 PROCEDURE — 97535 SELF CARE MNGMENT TRAINING: CPT

## 2020-01-27 PROCEDURE — 97162 PT EVAL MOD COMPLEX 30 MIN: CPT

## 2020-01-27 PROCEDURE — 93306 TTE W/DOPPLER COMPLETE: CPT

## 2020-01-27 PROCEDURE — 97112 NEUROMUSCULAR REEDUCATION: CPT

## 2020-01-27 PROCEDURE — 1200000000 HC SEMI PRIVATE

## 2020-01-27 RX ORDER — OXYCODONE HYDROCHLORIDE AND ACETAMINOPHEN 5; 325 MG/1; MG/1
1 TABLET ORAL EVERY 6 HOURS PRN
Status: DISCONTINUED | OUTPATIENT
Start: 2020-01-27 | End: 2020-01-28 | Stop reason: HOSPADM

## 2020-01-27 RX ADMIN — CLOPIDOGREL BISULFATE 75 MG: 75 TABLET ORAL at 09:13

## 2020-01-27 RX ADMIN — ANTI-FUNGAL POWDER MICONAZOLE NITRATE TALC FREE: 1.42 POWDER TOPICAL at 20:00

## 2020-01-27 RX ADMIN — DORZOLAMIDE HYDROCHLORIDE 1 DROP: 20 SOLUTION/ DROPS OPHTHALMIC at 15:20

## 2020-01-27 RX ADMIN — OXYCODONE AND ACETAMINOPHEN 1 TABLET: 5; 325 TABLET ORAL at 22:11

## 2020-01-27 RX ADMIN — ASPIRIN 81 MG: 81 TABLET, COATED ORAL at 09:14

## 2020-01-27 RX ADMIN — DORZOLAMIDE HYDROCHLORIDE 1 DROP: 20 SOLUTION/ DROPS OPHTHALMIC at 20:00

## 2020-01-27 RX ADMIN — PANTOPRAZOLE SODIUM 40 MG: 40 TABLET, DELAYED RELEASE ORAL at 09:14

## 2020-01-27 RX ADMIN — DORZOLAMIDE HYDROCHLORIDE 1 DROP: 20 SOLUTION/ DROPS OPHTHALMIC at 11:32

## 2020-01-27 RX ADMIN — POTASSIUM CHLORIDE 20 MEQ: 20 TABLET, EXTENDED RELEASE ORAL at 09:13

## 2020-01-27 RX ADMIN — OXYCODONE AND ACETAMINOPHEN 1 TABLET: 5; 325 TABLET ORAL at 02:58

## 2020-01-27 RX ADMIN — CEFTRIAXONE SODIUM 1 G: 1 INJECTION, POWDER, FOR SOLUTION INTRAMUSCULAR; INTRAVENOUS at 07:45

## 2020-01-27 RX ADMIN — PRAVASTATIN SODIUM 40 MG: 40 TABLET ORAL at 09:14

## 2020-01-27 RX ADMIN — Medication 0.4 MG: at 22:29

## 2020-01-27 RX ADMIN — OXYCODONE AND ACETAMINOPHEN 1 TABLET: 5; 325 TABLET ORAL at 15:19

## 2020-01-27 RX ADMIN — ANTACID TABLETS 500 MG: 500 TABLET, CHEWABLE ORAL at 11:31

## 2020-01-27 RX ADMIN — OXYCODONE AND ACETAMINOPHEN 1 TABLET: 5; 325 TABLET ORAL at 09:13

## 2020-01-27 RX ADMIN — ENOXAPARIN SODIUM 40 MG: 40 INJECTION SUBCUTANEOUS at 09:14

## 2020-01-27 RX ADMIN — VITAMIN D, TAB 1000IU (100/BT) 1000 UNITS: 25 TAB at 09:13

## 2020-01-27 RX ADMIN — Medication 10 ML: at 20:00

## 2020-01-27 RX ADMIN — DILTIAZEM HYDROCHLORIDE 120 MG: 120 CAPSULE, COATED, EXTENDED RELEASE ORAL at 09:14

## 2020-01-27 RX ADMIN — MULTIPLE VITAMINS W/ MINERALS TAB 1 TABLET: TAB at 17:48

## 2020-01-27 RX ADMIN — ANTI-FUNGAL POWDER MICONAZOLE NITRATE TALC FREE: 1.42 POWDER TOPICAL at 11:32

## 2020-01-27 RX ADMIN — LEVOTHYROXINE SODIUM 100 MCG: 100 TABLET ORAL at 09:14

## 2020-01-27 ASSESSMENT — ENCOUNTER SYMPTOMS
ABDOMINAL PAIN: 0
NAUSEA: 0
COUGH: 0
CONSTIPATION: 0
VOMITING: 0
WHEEZING: 0
SHORTNESS OF BREATH: 0
RHINORRHEA: 0
BACK PAIN: 1
BLOOD IN STOOL: 0
DIARRHEA: 0
CHEST TIGHTNESS: 0

## 2020-01-27 ASSESSMENT — PAIN DESCRIPTION - PAIN TYPE: TYPE: ACUTE PAIN;CHRONIC PAIN

## 2020-01-27 ASSESSMENT — PAIN SCALES - GENERAL
PAINLEVEL_OUTOF10: 10
PAINLEVEL_OUTOF10: 0
PAINLEVEL_OUTOF10: 10
PAINLEVEL_OUTOF10: 8
PAINLEVEL_OUTOF10: 0
PAINLEVEL_OUTOF10: 10

## 2020-01-27 ASSESSMENT — PAIN DESCRIPTION - LOCATION: LOCATION: NECK

## 2020-01-27 NOTE — PROGRESS NOTES
[facility] - INPATIENT      Daily Progress Note     Admit Date: 1/25/2020  Bed/Room No.  2009/2009-02  Admitting Physician : Samuel Gillespie MD  Code Status :[address] Day:  LOS: 2 days   Chief Complaint:     Chief Complaint   Patient presents with    Chest Pain     chest wall pain after fall/ no chest pain prior to fall/ bilateral chest pain    Fall     walking with walker/ denies loss of consciousness    Tailbone Pain     Principal Problem:    Atypical chest pain  Active Problems:    CKD (chronic kidney disease) stage 3, GFR 30-59 ml/min ([facility]    Fall at home, initial encounter    Smallwood's esophagus    Essential hypertension    Troponin I above reference range    Chronic diastolic congestive heart failure ([facility]    Other specified hypothyroidism    Cardiac enzymes elevated    Acute cystitis with hematuria  Resolved Problems:    * No resolved hospital problems. *    Subjective : Interval History/Significant events :  01/27/20    Patient continues to be weak, tired, having difficulty ambulating. She still complains of chest pain, back pain, neck pain. Pain is worse on body movement, deep inspiration. patient is eating and drinking okay. Denies nausea, vomiting, dyspnea at rest.  Vitals - Stable afebrile  Labs -elevated creatinine 1.30. Nursing notes , Consults notes reviewed. Overnight events and updates discussed with Nursing staff . Background History:         Sharlyne Peabody is 80 y.o. female  Who was admitted to the hospital on 1/25/2020 for treatment of Atypical chest pain. Patient came to hospital after having mechanical fall while walking in home and tipping over rug and fell on tailbone. Patient denies any loss of consciousness or head trauma. She had been having chest pain after the fall. Patient has underlying history of chronic diastolic CHF, CKD stage III, hypertension, hypothyroidism, Smallwood's esophagus.   Evaluation emergency room with CT chest showed increase in interstitial lung disease and fibrosis, CT head and cervical spine was negative for acute fracture, traumatic malalignment but showed moderate to severe degenerative changes in cervical spine, remote encephalomalacia right occipital lobe. CT abdomen pelvis without contrast showed no acute traumatic sequelae. PMH:  Past Medical History:   Diagnosis Date    Smallwood's esophagus     Chronic diastolic congestive heart failure ([facility] 1/25/2020    Chronic kidney disease     Essential hypertension     Essential hypertension     Gout     Hypertension     Membranous glomerulonephritis     Osteoarthritis     Proteinuria     Unspecified sleep apnea       Allergies: Allergies   Allergen Reactions    Codeine     Ezetimibe     Methenamine     Motrin [Ibuprofen]     Nsaids     Pseudoephedrine Hcl     Zestril [Lisinopril]       Medications :  diltiazem, 120 mg, Oral, Daily  calcium carbonate, 500 mg, Oral, Daily  miconazole, , Topical, BID  cefTRIAXone (ROCEPHIN) IV, 1 g, Intravenous, Q24H  aspirin, 81 mg, Oral, Daily  clopidogrel, 75 mg, Oral, Daily  dorzolamide, 1 drop, Both Eyes, TID  levothyroxine, 100 mcg, Oral, Daily  therapeutic multivitamin-minerals, 1 tablet, Oral, Daily  pantoprazole, 40 mg, Oral, QAM AC  oxybutynin, 5 mg, Oral, BID  potassium chloride, 20 mEq, Oral, Daily with breakfast  pravastatin, 40 mg, Oral, Daily  Vitamin D, 1,000 Units, Oral, Daily  sodium chloride flush, 10 mL, Intravenous, 2 times per day  enoxaparin, 40 mg, Subcutaneous, Daily        Review of Systems   Review of Systems   Constitutional: Negative for appetite change, fatigue, fever and unexpected weight change. HENT: Negative for congestion, rhinorrhea and sneezing. Eyes: Negative for visual disturbance. Respiratory: Negative for cough, chest tightness, shortness of breath and wheezing. Cardiovascular: Positive for chest pain. Negative for palpitations.    Gastrointestinal: Negative for abdominal Range Status   01/26/2020 LARGE (A) NEGATIVE Final       Imaging / Clinical Data :-   Ct Abdomen Pelvis Wo Contrast Additional Contrast? None    Result Date: 1/25/2020  No acute traumatic sequelae. Incidental findings as above. Xr Pelvis (1-2 Views)    Result Date: 1/25/2020  No fracture identified but sensitivity limited due to demineralization. Ct Head Wo Contrast    Result Date: 1/25/2020  Chronic small vessel ischemic disease. Remote encephalomalacia right occipital lobe. Moderate To severe degenerate changes cervical spine. No acute fracture traumatic malalignment. Ct Chest Wo Contrast    Result Date: 1/25/2020  Interval increase in the interstitial lung disease and fibrosis. Patchy nonspecific ground-glass infiltrates. Differential considerations include infectious, inflammatory, and neoplastic etiologies. Follow-up recommended. Mild compression fracture upper thoracic spine, age indeterminate. Ct Cervical Spine Wo Contrast    Result Date: 1/25/2020  Chronic small vessel ischemic disease. Remote encephalomalacia right occipital lobe. Moderate To severe degenerate changes cervical spine. No acute fracture traumatic malalignment. Xr Chest Portable    Result Date: 1/25/2020  Mild edema versus interstitial lung disease. Clinical Course : stable  Assessment and Plan  :        1. Accidental fall -supportive care, symptomatic pain control  2. Musculoskeletal chest pain -follow echocardiogram.  3. Essential hypertension -well-controlled  4. Gout   5. Smallwood's esophagus -continue PPI  6. UTI-Rocephin   7. Osteoporosis-on Prolia  8. Moderate mitral stenosis -     PT / OT evaluation. May need skilled nursing facility placement. Continue to monitor vitals , Intake / output ,  Cell count , HGB , Kidney function, oxygenation  as indicated . Plan and updates discussed with patient ,  answers  explained to satisfaction.    Plan discussed with Staff Fercho Hopkins RN     (Please note that portions of this note were completed with a voice recognition program. Efforts were made to edit the dictations but occasionally words are mis-transcribed.)      Annie Melara MD  1/27/2020

## 2020-01-27 NOTE — PROGRESS NOTES
Physical Therapy    Facility/Department: [facility]  Initial Assessment    NAME: Zehra Weaver  : 1928  MRN: [id number]    Date of Service: 2020    Discharge Recommendations:  Subacute/Skilled Nursing Facility     Pt presented to ED on 20 with Chest Pain (chest wall pain after fall/ no chest pain prior to fall/ bilateral chest pain); Fall (walking with walker/ denies loss of consciousness); and Tailbone Pain    RN reports patient is medically stable for therapy treatment this date. Chart reviewed prior to treatment and patient is agreeable for therapy. Franklin Mary    Assessment   Body structures, Functions, Activity limitations: Decreased functional mobility ; Decreased strength;Decreased endurance;Decreased balance  Assessment: Pt tolerated session well with deficits noted in bed mobility, & is presently dependent for transfers & unable to ambulate with dependent standing balance and very limited endurance this session. Pt demonstrates significant deficits as compared to prior level of function. Pt requires continued IP PT & D/C to [address] to maximize independence with functional mobility, balance, safety awareness & activity tolerance. Pt HIGH risk for falls & recommend SNF at D/C to return to baseline function and a safe D/C back to home environment. Prognosis: Good  PT Education: Functional Mobility Training;Transfer Training  Patient Education: Ed safe functional mobility, safety awareness, prevention of sedentary complications, LE ex's for 10 reps  REQUIRES PT FOLLOW UP: Yes  Activity Tolerance  Activity Tolerance: Patient limited by fatigue;Patient limited by pain       Patient Diagnosis(es): The primary encounter diagnosis was Chest pain, unspecified type. Diagnoses of Fall, initial encounter and Cardiac enzymes elevated were also pertinent to this visit.      has a past medical history of Smallwood's esophagus, Chronic diastolic congestive states her spouse assists as needed )  Homemaking Assistance: Needs assistance(spouse does heavy cleaning)  Homemaking Responsibilities: No  Ambulation Assistance: Independent  Transfer Assistance: Independent  Active : No  Patient's  Info: spouse  Occupation: Retired  Type of occupation: Teacher   Leisure & Hobbies: sewing   Additional Comments: [facility] Question realiability of info pt reported as inconsistences with PT vs OT eval on what pt reported. Objective     Observation/Palpation  Posture: Fair  Observation: resting in bed, has RUE IV & on O2 [email or web address] L/min, holding head & UB in guarded position    AROM RLE (degrees)  RLE AROM: WFL  AROM LLE (degrees)  LLE AROM : WFL  Strength RLE  Comment: 3+/5  Strength LLE  Comment: 3+/5  Strength RUE  Comment: 4/5  Strength LUE  Comment: 3-/5 shoulder  Tone RLE  RLE Tone: Normotonic  Tone LLE  LLE Tone: Normotonic  Motor Control  Gross Motor?: WFL  Sensation  Overall Sensation Status: WFL  Bed mobility  Rolling to Right: Maximum assistance  Supine to Sit: Maximum assistance  Scooting: Maximal assistance  Comment: cues + maxA to reach to bedrail & to use UB, needs manual assist to bring legs to EOB   Pt sat at EOB for 10 minutes to rest after bed mobility & prepare lines for standing & ambulation. Transfers  Sit to Stand: Dependent/Total  Comment: attempted to stand to walker but unable to do safely, Placed Amandeep Keens, stood into device with max assist, mobilized to chair & sat from device with max assist  Ambulation  Ambulation?: No     Balance  Sitting - Static: Good  Sitting - Dynamic: Good;-  Standing - Static: (dependent)  Standing - Dynamic: (dependent)  Exercises  Comments: Ed functional mobility, safety awareness, prevention of sedentary complications, LE ex's for 10 reps    All lines intact, call light within reach, and patient positioned comfortably at end of treatment.   All patient needs addressed prior to ending therapy session. Plan   Plan  Times per week: 1-2x/D,5-6D/week  Current Treatment Recommendations: Balance Training, Functional Mobility Training, Transfer Training, Endurance Training, Gait Training, Stair training  Safety Devices  Type of devices: Gait belt, Patient at risk for falls, Left in chair, Chair alarm in place, Call light within reach, Nurse notified    G-Code       OutComes Score                                                  AM-PAC Score  AM-PAC Inpatient Mobility Raw Score : 9 (01/27/20 0845)  AM-PAC Inpatient T-Scale Score : 30.55 (01/27/20 0845)  Mobility Inpatient CMS 0-100% Score: 81.38 (01/27/20 0845)  Mobility Inpatient CMS G-Code Modifier : CM (01/27/20 0845)          Goals  Short term goals  Time Frame for Short term goals: 12 visits  Short term goal 1: Inc bed mobility to indep; Short term goal 2: Inc transfers bed to chair with stand by assist;  Short term goal 3: Inc strength to [address] standing balance to good with device to facilitate pt independence for performance of ADL's & functional mobility, & reduce fall risk; Short term goal 4: Pt able to tolerate 30-40 min of activity to include 15-20 reps of ex & functional mobility including 5 minutes of standing to facilitate activity tolerance to [facility]; Short term goal 5: Aseess gait & set goals as appropriate;   Short term goal 6: Ed pt on home ex's, safety & energy principles & issue written home program;  Patient Goals   Patient goals : regain independence       Therapy Time   Individual Concurrent Group Co-treatment   Time In 0756         Time Out 0850         Minutes 54+10=64              Additional 10 minutes for chart review          [address], PT

## 2020-01-27 NOTE — CARE COORDINATION
Social Work-Contacted patient's son regarding dc plans. The Plan for Transition of Care is related to the following treatment goals: SNF with PT/OT for strentghening and skilled nursing    The Patient and/or patient representative patient's sonSteven was provided with a choice of provider and agrees   with the discharge plan. [x] Yes [] No    Freedom of choice list was provided with basic dialogue that supports the patient's individualized plan of care/goals, treatment preferences and shares the quality data associated with the providers. [x] Yes [] No. Son would like patient to receive rehab at [facility].  Patient was there in the past. Arabella Mora

## 2020-01-27 NOTE — PROGRESS NOTES
Occupational Therapy   Occupational Therapy Initial Assessment  Date: 2020   Patient Name: Perez Osorio  MRN: [id number]     : 1928    RN Carolee Henry reports patient is medically stable for therapy treatment this date. Chart reviewed prior to treatment and patient is agreeable for therapy. All lines intact and patient positioned comfortably at end of treatment. All patient needs addressed prior to ending therapy session. Date of Service: 2020    Discharge Recommendations:  Subacute/Skilled Nursing Facility  OT Equipment Recommendations  Equipment Needed: (continue to assess and speak to family as able )    Assessment   Performance deficits / Impairments: Decreased functional mobility ; Decreased strength;Decreased ROM; Decreased ADL status; Decreased safe awareness;Decreased cognition;Decreased balance;Decreased high-level IADLs;Decreased endurance;Decreased posture;Decreased coordination;Decreased vision/visual deficit  Assessment: Skilled OT POC is recommended for this pt to increase I and safety in function to return home with assist as needed. Prognosis: Fair  Decision Making: Medium Complexity  OT Education: OT Role;Plan of Care;Transfer Training  Patient Education: DC recommendations, call light use/fall prevention, postural control, proper bed mob tech, pursed lip breathing tech, safety in function   Barriers to Learning: memory deficits   REQUIRES OT FOLLOW UP: Yes  Activity Tolerance  Activity Tolerance: Patient limited by fatigue;Treatment limited secondary to decreased cognition;Patient limited by pain  Activity Tolerance: poor plus   Safety Devices  Safety Devices in place: Yes  Type of devices: Bed alarm in place;Call light within reach; Left in bed;Patient at risk for falls;Gait belt;Nurse notified           Patient Diagnosis(es): The primary encounter diagnosis was Chest pain, unspecified type.  Diagnoses of Fall, initial encounter and Cardiac enzymes elevated were also pertinent to essential hypertension, hypothyroidism, and Smallwood's esophagus. Restrictions  Restrictions/Precautions  Restrictions/Precautions: General Precautions, Fall Risk, Up as Tolerated  Position Activity Restriction  Other position/activity restrictions: Bed alarm, Heels off bed if unable to move LE's, turn/assist Q 2 hrs if unable to turn self, up as tolerated, telemetry, RUE IV, 1.5 L O2 per NC, cardiac diet/no caffeine, bee richard used this date     Subjective   General  Chart Reviewed: Yes  Patient assessed for rehabilitation services?: Yes  Family / Caregiver Present: No  *pt c/o 10/10 neck pain and RN was notified. Social/Functional History  Social/Functional History  Lives With: Spouse  Type of Home: House  Home Layout: Two level, Bed/Bath upstairs, 1/2 bath on main level, Laundry in basement, Able to Live on Main level with bedroom/bathroom  Home Access: Stairs to enter without rails(garage entrance )  Entrance Stairs - Number of Steps: 2  Entrance Stairs - Rails: Right  Bathroom Shower/Tub: Tub/Shower unit  Bathroom Equipment: Shower chair, Grab bars in shower  Home Equipment: Lavona Luke, 4 wheeled walker, Rolling walker  Receives Help From: Family(Pt states her spouse is supprotive )  ADL Assistance: Needs assistance(Pt states her spouse assists as needed )  Homemaking Assistance: Needs assistance(spouse does heavy cleaning)  Homemaking Responsibilities: No  Ambulation Assistance: Independent  Transfer Assistance: Independent  Active : No  Patient's  Info: spouse  Occupation: Retired  Type of occupation: Teacher   Leisure & Hobbies: sewing   Additional Comments: [facility] Question realiability of info pt reported as inconsistencees noted with PT vs OT eval on what pt reported.          Objective   Vision: Impaired  Vision Exceptions: Wears glasses for reading  Hearing: Exceptions to [facility]  Hearing Exceptions: Hard of hearing/hearing concerns Orientation  Overall Orientation Status: Within Functional Limits  Observation/Palpation  Posture: Poor  Observation: O2 per NC, RUE IV, pt constantly moaning out in pain. Pt with increased perseveration on her neck pain and RN aware. Balance  Sitting Balance: Minimal assistance  Standing Balance: Maximum assistance(in bee stedy )  Standing Balance  Time: stand bridgett < 10 seconds in bee stedy   Functional Mobility  Functional Mobility Comments: N/T and pt unable/not safe and bee stedy used for transfer. Toilet Transfers  Toilet Transfers Comments: N/T and pt with no needs during eval.    ADL  Feeding: Setup  Grooming: Setup; Moderate assistance  UE Bathing: Setup; Moderate assistance  LE Bathing: Dependent/Total  UE Dressing: Setup; Moderate assistance with hosp gown   LE Dressing: Dependent/Total  Toileting: Dependent/Total  Additional Comments: 2 staff assist/DEP with call care and use of bee stedy.     Tone RUE  RUE Tone: Normotonic  Tone LUE  LUE Tone: Normotonic  Coordination  Movements Are Fluid And Coordinated: No  Coordination and Movement description: Fine motor impairments     Bed mobility  Supine to Sit: Unable to assess(Pt up in chair upon arrival.)  Sit to Supine: Maximum assistance;2 Person assistance  Scooting: Dependent/Total(2 staff assist to boost up in bed )  Comment: Max verbal instruction/tactile assist for proper log rolling tech and hand placement on rail as able to assist.    Transfers  Stand Pivot Transfers: 2 Person assistance;Maximum assistance;Dependent/Total(with use of bee stedy and max assist of 2 bedside chair to bed )  Sit to stand: 2 Person assistance;Maximum assistance  Stand to sit: 2 Person assistance;Maximum assistance  Transfer Comments: Max verbal instruction/tactile assist for hand placement, nose over toes as able, safety feature of bee stedy, upright/midline posture, weight shifting, controlled stand to sit, and assist and awareness of all lines to increase safety/reduce fall risk. Cognition  Overall Cognitive Status: Exceptions  Arousal/Alertness: Delayed responses to stimuli  Following Commands: Follows one step commands with increased time; Follows one step commands with repetition  Attention Span: Attends with cues to redirect  Memory: Decreased short term memory  Safety Judgement: Decreased awareness of need for safety;Decreased awareness of need for assistance  Problem Solving: Assistance required to implement solutions;Assistance required to generate solutions;Assistance required to identify errors made;Assistance required to correct errors made;Decreased awareness of errors  Insights: Decreased awareness of deficits  Initiation: Requires cues for some  Sequencing: Requires cues for some  Perception  Overall Perceptual Status: WFL     Sensation  Overall Sensation Status: WFL        LUE AROM (degrees)  LUE AROM : Exceptions  LUE General AROM: L shld approx 40 degrees for flexion and PROM approx to 70 degrees with tightness and c/o pain; rest WFLS   RUE AROM (degrees)  RUE AROM : Exceptions  RUE General AROM: R shld AROM for flexion approx to 40 degrees and PROM approx to 90 degrees with c/o pain; rest WFLS   LUE Strength  Gross LUE Strength: Exceptions to [facility]  LUE Strength Comment: B shld 3 minus/5 and rest grossly 4 minus/5   RUE Strength  Gross RUE Strength: Exceptions to [address] [place] [address]  Times per week: 4-5x/week 1x/day as bridgett   Current Treatment Recommendations: Strengthening, Balance Training, Neuromuscular Re-education, Safety Education & Training, Self-Care / ADL, Endurance Training, Pain Management, Equipment Evaluation, Education, & procurement, Functional Mobility Training, Patient/Caregiver Education & Training, Cognitive/Perceptual Training, Home Management Training                                                  AM-PAC Score   11          Goals  Short term goals  Time Frame for Short term goals: by discharge, pt to demo Short term goal 1: increase in BUE strength by a 1/2 grade to assist with self care tasks. Short term goal 2: bed mob tasks with use of rail as needed to max assist of 1. Short term goal 3: UB ADL to min assist and LB ADL to max assist of 1 supine in bed and with use of rail/AE as needed. Short term goal 4: ADL transfers with use of AD/lift as needed to mod-min assist of 2. (Add functional mob goal to POC as appropriate )  Short term goal 5: postural control EOB to SBA and bridgett > 15 mins to increase core strength for ADL tasks. Long term goals  Long term goal 1: Staff/family to be I wtih BUE HEP, EC/WS and fall prevention tech, pressure relief, with hand outs as needed. Long term goal 2: Pt to increase toileting tasks with use of BSC/AD and grab bar as needed to mod assist of 1. Patient Goals   Patient goals : pt unable to state when asked however nodded yes when asked of she would like to return home as able.          Therapy Time   Individual Concurrent Group Co-treatment   Time In 0933(plus `10 min chart review/RN communication)         Time Out 1005         Minutes 32         Timed Code Treatment Minutes: [address], OT

## 2020-01-27 NOTE — PROGRESS NOTES
Pt's son said if pt. Is recommended to go to a facility [facility] will be their first choice.

## 2020-01-27 NOTE — FLOWSHEET NOTE
Patient receives Sacrament of the Sick (anointing) from [facility].    [facility] will follow as needed. (writer charting for [employer].)     78/80/31 6629   Encounter Summary   Services provided to: Patient   Referral/Consult From: Rounding   Continue Visiting   (1/27/20 anointed)   Complexity of Encounter Low   Length of Encounter 15 minutes   Routine   Type Follow up   Sacraments   Sacrament of Sick-Anointing Anointed  (1/27/20 Fr.  Frutoso Share)

## 2020-01-28 VITALS
WEIGHT: 185.7 LBS | DIASTOLIC BLOOD PRESSURE: 62 MMHG | HEIGHT: 64 IN | RESPIRATION RATE: 18 BRPM | BODY MASS INDEX: 31.7 KG/M2 | SYSTOLIC BLOOD PRESSURE: 135 MMHG | OXYGEN SATURATION: 96 % | HEART RATE: 68 BPM | TEMPERATURE: 97.7 F

## 2020-01-28 LAB
ANION GAP SERPL CALCULATED.3IONS-SCNC: 9 MMOL/L (ref 9–17)
BUN BLDV-MCNC: 28 MG/DL (ref 8–23)
BUN/CREAT BLD: 18 (ref 9–20)
CALCIUM SERPL-MCNC: 8.7 MG/DL (ref 8.6–10.4)
CHLORIDE BLD-SCNC: 106 MMOL/L (ref 98–107)
CO2: 22 MMOL/L (ref 20–31)
CREAT SERPL-MCNC: 1.55 MG/DL (ref 0.5–0.9)
CULTURE: ABNORMAL
GFR AFRICAN AMERICAN: 38 ML/MIN
GFR NON-AFRICAN AMERICAN: 31 ML/MIN
GFR SERPL CREATININE-BSD FRML MDRD: ABNORMAL ML/MIN/{1.73_M2}
GFR SERPL CREATININE-BSD FRML MDRD: ABNORMAL ML/MIN/{1.73_M2}
GLUCOSE BLD-MCNC: 109 MG/DL (ref 70–99)
Lab: ABNORMAL
POTASSIUM SERPL-SCNC: 4.9 MMOL/L (ref 3.7–5.3)
SODIUM BLD-SCNC: 137 MMOL/L (ref 135–144)
SPECIMEN DESCRIPTION: ABNORMAL

## 2020-01-28 PROCEDURE — 80048 BASIC METABOLIC PNL TOTAL CA: CPT

## 2020-01-28 PROCEDURE — 6360000002 HC RX W HCPCS: Performed by: NURSE PRACTITIONER

## 2020-01-28 PROCEDURE — 6370000000 HC RX 637 (ALT 250 FOR IP): Performed by: NURSE PRACTITIONER

## 2020-01-28 PROCEDURE — 2580000003 HC RX 258: Performed by: NURSE PRACTITIONER

## 2020-01-28 PROCEDURE — 6370000000 HC RX 637 (ALT 250 FOR IP): Performed by: FAMILY MEDICINE

## 2020-01-28 PROCEDURE — 36415 COLL VENOUS BLD VENIPUNCTURE: CPT

## 2020-01-28 PROCEDURE — 6370000000 HC RX 637 (ALT 250 FOR IP): Performed by: INTERNAL MEDICINE

## 2020-01-28 PROCEDURE — 99239 HOSP IP/OBS DSCHRG MGMT >30: CPT | Performed by: FAMILY MEDICINE

## 2020-01-28 RX ORDER — CEFDINIR 300 MG/1
300 CAPSULE ORAL DAILY
Qty: 5 CAPSULE | Refills: 0 | DISCHARGE
Start: 2020-01-28 | End: 2020-02-02

## 2020-01-28 RX ORDER — FUROSEMIDE 20 MG/1
20 TABLET ORAL DAILY
Status: DISCONTINUED | OUTPATIENT
Start: 2020-01-28 | End: 2020-01-28 | Stop reason: HOSPADM

## 2020-01-28 RX ORDER — OXYCODONE HYDROCHLORIDE AND ACETAMINOPHEN 5; 325 MG/1; MG/1
1 TABLET ORAL EVERY 8 HOURS PRN
Qty: 9 TABLET | Refills: 0 | Status: SHIPPED | OUTPATIENT
Start: 2020-01-28 | End: 2020-01-31

## 2020-01-28 RX ORDER — CALCIUM CARBONATE 200(500)MG
500 TABLET,CHEWABLE ORAL DAILY
Qty: 30 TABLET | Refills: 0 | Status: SHIPPED | OUTPATIENT
Start: 2020-01-28 | End: 2020-02-27

## 2020-01-28 RX ADMIN — PRAVASTATIN SODIUM 40 MG: 40 TABLET ORAL at 10:46

## 2020-01-28 RX ADMIN — ANTI-FUNGAL POWDER MICONAZOLE NITRATE TALC FREE: 1.42 POWDER TOPICAL at 10:45

## 2020-01-28 RX ADMIN — ENOXAPARIN SODIUM 40 MG: 40 INJECTION SUBCUTANEOUS at 10:46

## 2020-01-28 RX ADMIN — VITAMIN D, TAB 1000IU (100/BT) 1000 UNITS: 25 TAB at 10:46

## 2020-01-28 RX ADMIN — ANTACID TABLETS 500 MG: 500 TABLET, CHEWABLE ORAL at 10:46

## 2020-01-28 RX ADMIN — DORZOLAMIDE HYDROCHLORIDE 1 DROP: 20 SOLUTION/ DROPS OPHTHALMIC at 10:45

## 2020-01-28 RX ADMIN — ASPIRIN 81 MG: 81 TABLET, COATED ORAL at 10:46

## 2020-01-28 RX ADMIN — CEFTRIAXONE SODIUM 1 G: 1 INJECTION, POWDER, FOR SOLUTION INTRAMUSCULAR; INTRAVENOUS at 06:04

## 2020-01-28 RX ADMIN — ACETAMINOPHEN 650 MG: 325 TABLET ORAL at 11:38

## 2020-01-28 RX ADMIN — FUROSEMIDE 20 MG: 20 TABLET ORAL at 12:43

## 2020-01-28 RX ADMIN — OXYCODONE AND ACETAMINOPHEN 1 TABLET: 5; 325 TABLET ORAL at 06:09

## 2020-01-28 RX ADMIN — DILTIAZEM HYDROCHLORIDE 120 MG: 120 CAPSULE, COATED, EXTENDED RELEASE ORAL at 10:46

## 2020-01-28 RX ADMIN — LEVOTHYROXINE SODIUM 100 MCG: 100 TABLET ORAL at 06:05

## 2020-01-28 RX ADMIN — POTASSIUM CHLORIDE 20 MEQ: 20 TABLET, EXTENDED RELEASE ORAL at 10:49

## 2020-01-28 RX ADMIN — CLOPIDOGREL BISULFATE 75 MG: 75 TABLET ORAL at 10:46

## 2020-01-28 RX ADMIN — OXYCODONE AND ACETAMINOPHEN 1 TABLET: 5; 325 TABLET ORAL at 12:45

## 2020-01-28 RX ADMIN — ACETAMINOPHEN 650 MG: 325 TABLET ORAL at 02:52

## 2020-01-28 RX ADMIN — PANTOPRAZOLE SODIUM 40 MG: 40 TABLET, DELAYED RELEASE ORAL at 06:05

## 2020-01-28 ASSESSMENT — ENCOUNTER SYMPTOMS
DIARRHEA: 0
WHEEZING: 0
SHORTNESS OF BREATH: 0
CONSTIPATION: 0
VOMITING: 0
COUGH: 0
BACK PAIN: 1
CHEST TIGHTNESS: 0
ABDOMINAL PAIN: 0
RHINORRHEA: 0
NAUSEA: 0
BLOOD IN STOOL: 0

## 2020-01-28 ASSESSMENT — PAIN SCALES - GENERAL
PAINLEVEL_OUTOF10: 10
PAINLEVEL_OUTOF10: 10
PAINLEVEL_OUTOF10: 8
PAINLEVEL_OUTOF10: 9

## 2020-01-28 NOTE — PROGRESS NOTES
The pt was discharged to [facility]. [facility]. Writer attempted to call report. My phone number and name were left awaiting a return phone call.

## 2020-01-28 NOTE — PROGRESS NOTES
for acute fracture, traumatic malalignment but showed moderate to severe degenerative changes in cervical spine, remote encephalomalacia right occipital lobe. CT abdomen pelvis without contrast showed no acute traumatic sequelae. PMH:  Past Medical History:   Diagnosis Date    Smallwood's esophagus     Chronic diastolic congestive heart failure ([facility] [address] 1/25/2020    Chronic kidney disease     Essential hypertension     Gout     Hypertension     Membranous glomerulonephritis     Osteoarthritis     Proteinuria     Unspecified sleep apnea       Allergies: Allergies   Allergen Reactions    Codeine     Ezetimibe     Methenamine     Motrin [Ibuprofen]     Nsaids     Pseudoephedrine Hcl     Zestril [Lisinopril]       Medications :  diltiazem, 120 mg, Oral, Daily  calcium carbonate, 500 mg, Oral, Daily  miconazole, , Topical, BID  cefTRIAXone (ROCEPHIN) IV, 1 g, Intravenous, Q24H  aspirin, 81 mg, Oral, Daily  clopidogrel, 75 mg, Oral, Daily  dorzolamide, 1 drop, Both Eyes, TID  levothyroxine, 100 mcg, Oral, Daily  therapeutic multivitamin-minerals, 1 tablet, Oral, Daily  pantoprazole, 40 mg, Oral, QAM AC  oxybutynin, 5 mg, Oral, BID  potassium chloride, 20 mEq, Oral, Daily with breakfast  pravastatin, 40 mg, Oral, Daily  Vitamin D, 1,000 Units, Oral, Daily  sodium chloride flush, 10 mL, Intravenous, 2 times per day  enoxaparin, 40 mg, Subcutaneous, Daily        Review of Systems   Review of Systems   Constitutional: Negative for appetite change, fatigue, fever and unexpected weight change. HENT: Negative for congestion, rhinorrhea and sneezing. Eyes: Negative for visual disturbance. Respiratory: Negative for cough, chest tightness, shortness of breath and wheezing. Cardiovascular: Positive for chest pain. Negative for palpitations. Gastrointestinal: Negative for abdominal pain, blood in stool, constipation, diarrhea, nausea and vomiting.    Genitourinary: Negative for dysuria, enuresis, the left side. Heart sounds: Normal heart sounds. No murmur. Pulmonary:      Effort: Pulmonary effort is normal.      Breath sounds: Normal breath sounds. No wheezing or rales. Abdominal:      Palpations: Abdomen is soft. There is no mass. Tenderness: There is no abdominal tenderness. Musculoskeletal:      Comments: Bilateral knee surgical scars    Lymphadenopathy:      Head:      Right side of head: No submandibular adenopathy. Left side of head: No submandibular adenopathy. Cervical: No cervical adenopathy. Skin:     General: Skin is warm. Neurological:      Mental Status: She is alert and oriented to person, place, and time. Motor: No tremor. Psychiatric:         Behavior: Behavior is cooperative.            Laboratory findings:    Recent Labs     01/25/20 1930 01/26/20  0325   WBC 10.5 7.6   HGB 12.1 11.5*   HCT 40.7 38.2   * 129*     Recent Labs     01/25/20 1930 01/26/20 0325 01/27/20  0526 01/28/20  0531    139 137 137   K 4.4 4.3 4.6 4.9    108* 107 106   CO2 21 19* 22 22   GLUCOSE 118* 105* 105* 109*   BUN 24* 23 26* 28*   CREATININE 1.12* 1.09* 1.30* 1.55*   MG 2.1 2.1  --   --    CALCIUM 9.7 9.2 8.8 8.7     Recent Labs     01/25/20 1930 01/25/20 2115 01/26/20  0325   PROT  --   --  6.9   LABALBU  --   --  3.2*   AST  --   --  19   ALT  --   --  11   ALKPHOS  --   --  92   BILITOT  --   --  0.53   CHOL  --   --  206*   HDL  --   --  53   LDLCHOLESTEROL  --   --  129   CHOLHDLRATIO  --   --  3.9   TRIG  --   --  120   VLDL  --   --  NOT REPORTED   CKTOTAL 103 110  --           Specific Gravity, UA   Date Value Ref Range Status   01/26/2020 1.020 1.005 - 1.030 Final     Protein, UA   Date Value Ref Range Status   01/26/2020 2+ (A) NEGATIVE Final     RBC, UA   Date Value Ref Range Status   01/26/2020 TOO NUMEROUS TO COUNT 0 - 2 /HPF Final     Bacteria, UA   Date Value Ref Range Status   01/26/2020 MODERATE (A) None Final     Nitrite, Urine   Date Value Ref Range Status   01/26/2020 POSITIVE (A) NEGATIVE Final     WBC, UA   Date Value Ref Range Status   01/26/2020 TOO NUMEROUS TO COUNT 0 - 5 /HPF Final     Leukocyte Esterase, Urine   Date Value Ref Range Status   01/26/2020 LARGE (A) NEGATIVE Final       Imaging / Clinical Data :-   Ct Abdomen Pelvis Wo Contrast Additional Contrast? None    Result Date: 1/25/2020  No acute traumatic sequelae. Incidental findings as above. Xr Pelvis (1-2 Views)    Result Date: 1/25/2020  No fracture identified but sensitivity limited due to demineralization. Ct Head Wo Contrast    Result Date: 1/25/2020  Chronic small vessel ischemic disease. Remote encephalomalacia right occipital lobe. Moderate To severe degenerate changes cervical spine. No acute fracture traumatic malalignment. Ct Chest Wo Contrast    Result Date: 1/25/2020  Interval increase in the interstitial lung disease and fibrosis. Patchy nonspecific ground-glass infiltrates. Differential considerations include infectious, inflammatory, and neoplastic etiologies. Follow-up recommended. Mild compression fracture upper thoracic spine, age indeterminate. Ct Cervical Spine Wo Contrast    Result Date: 1/25/2020  Chronic small vessel ischemic disease. Remote encephalomalacia right occipital lobe. Moderate To severe degenerate changes cervical spine. No acute fracture traumatic malalignment. Xr Chest Portable    Result Date: 1/25/2020  Mild edema versus interstitial lung disease. Echo 1/25/20     Mild left ventricular hypertrophy  Global left ventricular systolic function is normal. Estimated ejection  fraction is 65 % . Grade I (mild) left ventricular diastolic dysfunction. Mild tricuspid regurgitation. Moderate pulmonary hypertension, with RVSP of 50 mmHg.     Clinical Course : stable  Assessment and Plan  :        1. Accidental fall -supportive care, symptomatic pain control  2.  Musculoskeletal chest pain  - preserved EF on echo without any WMA . 3. Essential hypertension -well-controlled  4. Gout   5. Smallwood's esophagus -continue PPI  6. UTI- Rocephin - discharge on keflex. 7. Osteoporosis-on Prolia  8. Moderate mitral stenosis -   9. Acute on chronic kidney disease - stage 3 - resume lasix . 10. Chronic diastolic CHF - resume lasix . Continue aspirin and Cardizem   11. H/o stroke in 2002 -       PT / OT evaluation. Discharge to skilled nursing facility placement. Plan and updates discussed with patient ,  answers  explained to satisfaction.    Plan discussed with Staff Fabian Chung RN     (Please note that portions of this note were completed with a voice recognition program. Efforts were made to edit the dictations but occasionally words are mis-transcribed.)      Janna Gee MD  1/28/2020

## 2020-01-28 NOTE — DISCHARGE INSTR - COC
Continuity of Care Form    Patient Name: Lucero King   :  1928  MRN:  [id number]    Admit date:  2020  Discharge date:  20    Code Status Order: Full Code   Advance Directives:   Advance Care Flowsheet Documentation     Date/Time Healthcare Directive Type of Healthcare Directive Copy in [address] Agent's Name Healthcare Agent's Phone Number    20 6515  Yes, patient has an advance directive for healthcare treatment  Durable power of  for health care --  Adult Children  -- Cassandra Nieves  --    20 0435  Other (Comment) pt is unsure  -- --  --  -- --          Admitting Physician:  Arlene Bronson MD  PCP: Marina Lema    Discharging Nurse: Rosaline Hernandez Unit/Room#:   Discharging Unit Phone Number: [phone]    Emergency Contact:   Extended Emergency Contact Information  Primary Emergency Contact: [facility]  Address: [place] [address]  Home Phone: [phone]  Relation: Spouse  Secondary Emergency Contact: Di Boas  Address: [place] [address]  Home Phone: [phone]  Relation: Child    Past Surgical History:  Past Surgical History:   Procedure Laterality Date    APPENDECTOMY      JOINT REPLACEMENT      TONSILLECTOMY         Immunization History: There is no immunization history on file for this patient. Active Problems:  Patient Active Problem List   Diagnosis Code    CKD (chronic kidney disease) stage 3, GFR 30-59 ml/min ([facility]) N18.3    HTN (hypertension) I10    Proteinuria R80.9    Atypical chest pain R07.89    Fall at home, initial encounter [address] [place], Y92.009    Chronic kidney disease N18.9    Smallwood's esophagus K22.70    Essential hypertension I10    Troponin I above reference range R79.89    Chronic diastolic congestive heart failure (HCC) I50.32    Other specified hypothyroidism E03.8    Cardiac enzymes elevated R74.8    Acute cystitis with Date: ***    Readmission Risk Assessment Score:  Readmission Risk              Risk of Unplanned Readmission:        17           Discharging to Facility/ Agency   · Name: [facility]  · Address:  · Phone:[phone]  · Fax:[phone]    Dialysis Facility (if applicable)   · Name:  · Address:  · Dialysis Schedule:  · Phone:  · Fax:    / signature: Electronically signed by BELTRAN Washington on 1/28/20 at 8:45 AM    PHYSICIAN SECTION    Prognosis: Fair    Condition at Discharge: Stable    Rehab Potential (if transferring to Rehab): Fair    Recommended Labs or Other Treatments After Discharge: PT , OT , fall precautions . Physician Certification: I certify the above information and transfer of Deidra Moreno  is necessary for the continuing treatment of the diagnosis listed and that she requires [facility] for less 30 days.      Update Admission H&P: No change in H&P    PHYSICIAN SIGNATURE:  Electronically signed by Boaz Victoria MD on 1/28/20 at 9:12 AM

## 2020-01-29 NOTE — DISCHARGE SUMMARY
spine was negative for acute fracture, traumatic malalignment but showed moderate to severe degenerative changes in cervical spine, remote encephalomalacia right occipital lobe. CT abdomen pelvis without contrast showed no acute traumatic sequelae. Significant therapeutic interventions:   1. Accidental fall - supportive care, symptomatic pain control  2. Musculoskeletal chest pain  - preserved EF on echo without any WMA . 3. Essential hypertension -well-controlled  4. Gout   5. Smallwood's esophagus -continue PPI  6. UTI- Rocephin - discharge on Cefnidir   7. Osteoporosis-on Prolia  8. Moderate mitral stenosis -   9. Acute on chronic kidney disease - stage 3 - resume lasix . 10. Chronic diastolic CHF - resume lasix . Continue aspirin and Cardizem   11.  H/o stroke in 2002 -        Significant Diagnostic Studies:   Labs / Micro:/Radiology  Recent Labs     01/26/20  0325 01/25/20  1930   WBC 7.6 10.5   HGB 11.5* 12.1   HCT 38.2 40.7   MCV 94.1 94.9   * 136*     Labs Renal Latest Ref Rng & Units 1/28/2020 1/27/2020 1/26/2020 1/25/2020 2/10/2015   BUN 8 - 23 mg/dL 28(H) 26(H) 23 24(H) 25   Cr 0.50 - 0.90 mg/dL 1.55(H) 1.30(H) 1.09(H) 1.12(H) 1.19   K 3.7 - 5.3 mmol/L 4.9 4.6 4.3 4.4 3.7   Na 135 - 144 mmol/L 137 137 139 139 136     Lab Results   Component Value Date    ALT 11 01/26/2020    AST 19 01/26/2020    ALKPHOS 92 01/26/2020    BILITOT 0.53 01/26/2020     No results found for: TSHFT4, TSH  No results found for: HAV, HEPAIGM, HEPBIGM, HEPBCAB, HBEAG, HEPCAB  Lab Results   Component Value Date    COLORU YELLOW 01/26/2020    NITRU POSITIVE 01/26/2020    GLUCOSEU NEGATIVE 01/26/2020    KETUA NEGATIVE 01/26/2020    UROBILINOGEN Normal 01/26/2020    BILIRUBINUR NEGATIVE 01/26/2020     No results found for: LABA1C  No results found for: EAG  Lab Results   Component Value Date    INR 1.0 01/20/2012    PROTIME 10.7 01/20/2012       Ct Abdomen Pelvis Wo Contrast Additional Contrast? None    Result Date: 1/25/2020  No acute traumatic sequelae. Incidental findings as above. Xr Pelvis (1-2 Views)    Result Date: 1/25/2020  No fracture identified but sensitivity limited due to demineralization. Ct Head Wo Contrast    Result Date: 1/25/2020  Chronic small vessel ischemic disease. Remote encephalomalacia right occipital lobe. Moderate To severe degenerate changes cervical spine. No acute fracture traumatic malalignment. Ct Chest Wo Contrast    Result Date: 1/25/2020  Interval increase in the interstitial lung disease and fibrosis. Patchy nonspecific ground-glass infiltrates. Differential considerations include infectious, inflammatory, and neoplastic etiologies. Follow-up recommended. Mild compression fracture upper thoracic spine, age indeterminate. Ct Cervical Spine Wo Contrast    Result Date: 1/25/2020  Chronic small vessel ischemic disease. Remote encephalomalacia right occipital lobe. Moderate To severe degenerate changes cervical spine. No acute fracture traumatic malalignment. Xr Chest Portable    Result Date: 1/25/2020  Mild edema versus interstitial lung disease. Consultations:    Consults:     Final Specialist Recommendations/Findings:   IP CONSULT TO INTERNAL MEDICINE      The patient was seen and examined on day of discharge and this discharge summary is in conjunction with any daily progress note from day of discharge. Discharge plan:     Disposition: skilled nursing facility     Physician Follow Up:     Nohelia NATHAN/Tayo Ford [address] [place] [phone]             Requiring Further Evaluation/Follow Up POST HOSPITALIZATION/Incidental Findings: PT , OT , antibiotics as advised. Diet: cardiac diet    Activity: As tolerated. Instructions to Patient: follow up with PCP .      Discharge Medications:      Medication List      START taking these medications    calcium carbonate 500 MG chewable tablet  Commonly known as:  TUMS  Take 1 tablet by medications  · oxyCODONE-acetaminophen 5-325 MG per tablet     Information about where to get these medications is not yet available    Ask your nurse or doctor about these medications  · cefdinir 300 MG capsule         Time Spent on discharge is  35 mins in patient examination, evaluation, counseling as well as medication reconciliation, prescriptions for required medications, discharge plan and follow up. Electronically signed by   Gloria Hardy MD  1/29/2020        Thank you Dr. Ron Zhao for the opportunity to be involved in this patient's care.

## 2020-07-06 ENCOUNTER — HOSPITAL ENCOUNTER (OUTPATIENT)
Age: 85
Setting detail: SPECIMEN
Discharge: HOME | End: 2020-07-06
Payer: MEDICARE

## 2020-07-06 LAB
HCT VFR BLD CALC: 34.2 % (ref 36.3–47.1)
HEMOGLOBIN: 9.8 G/DL (ref 11.9–15.1)
MCH RBC QN AUTO: 28.3 PG (ref 25.2–33.5)
MCHC RBC AUTO-ENTMCNC: 28.7 G/DL (ref 28.4–34.8)
MCV RBC AUTO: 98.8 FL (ref 82.6–102.9)
NRBC AUTOMATED: 0 PER 100 WBC
PDW BLD-RTO: 18 % (ref 11.8–14.4)
PLATELET # BLD: 139 K/UL (ref 138–453)
PMV BLD AUTO: 12.5 FL (ref 8.1–13.5)
RBC # BLD: 3.46 M/UL (ref 3.95–5.11)
WBC # BLD: 4.9 K/UL (ref 3.5–11.3)

## 2020-07-06 PROCEDURE — 85027 COMPLETE CBC AUTOMATED: CPT

## 2020-07-06 PROCEDURE — 36415 COLL VENOUS BLD VENIPUNCTURE: CPT

## 2020-07-06 PROCEDURE — P9603 ONE-WAY ALLOW PRORATED MILES: HCPCS

## 2020-11-06 NOTE — H&P
-- DO NOT REPLY / DO NOT REPLY ALL --  -- Message is from the [facility]--    COVID-19 Universal Screening: N/A - Not about scheduling    General Patient Message      Reason for Call: Patient is sending a reminder for the paperwork that was faxed over on 11/05/2020. Patient needs them filled out for short term disability. She also wants a nurse to return a phone call.     Caller Information       Type Contact Phone    11/06/2020 10:31 AM CST Phone (Incoming) Shikha Valiente (Self) [phone] (M)          Alternative phone number: none    Turnaround time given to caller:   \"This message will be sent to [state Provider's name]. The clinical team will fulfill your request as soon as they review your message.\"     [facility]    HISTORY AND PHYSICAL EXAMINATION            Date:   1/26/2020  Patient name:  Mayi Jean  Date of admission:  1/25/2020  6:47 PM  MRN:   [id number]  Account:  [de-identified]  YOB: 1928  PCP:    Maxx Israel  Room:   2009/2009-02  Code Status:    Full Code    Chief Complaint:     Chief Complaint   Patient presents with    Chest Pain     chest wall pain after fall/ no chest pain prior to fall/ bilateral chest pain    Fall     walking with walker/ denies loss of consciousness    Tailbone Pain       History Obtained From:     patient, spouse, electronic medical record    History of Present Illness:     Mayi Jean is a 80 y.o. Non-/non  female who presents with Chest Pain (chest wall pain after fall/ no chest pain prior to fall/ bilateral chest pain); Fall (walking with walker/ denies loss of consciousness); and Tailbone Pain   and is admitted to the hospital for the management of Chest pain. The patient presents after mechanical fall. She states she was walking with her walker and tripped over a rug falling backwards onto her tailbone and onto her back. She denies hitting her head or loss of consciousness. She does complain of posterior neck tenderness but she states she has that all the time. Since the fall she started to complain of chest pain. She states her pain is across the front of her chest right side and left side that is worse with breathing and with any type of activity. She rates her pain 7 out of 10 and describes as aching. He states prior to the fall she did not have chest pain and that she has not been sick recently. She denies nausea, vomiting, diaphoresis, shortness of breath. She denies problems with bowel or bladder. Her  states he has been doing this with her for over 20 years. He states she comes to the ER she gets admitted overnight and is discharged the next day. I explained to him that we were going to continue to monitor her cardiac enzymes and her vitals through the night and that hopefully she will be able to be discharged tomorrow if he is agreeable to her staying. Ultimately he is agreeable to admission. Her history includes chronic diastolic CHF, CKD stage III, essential hypertension, hypothyroidism, and Smallwood's esophagus. Pertinent data  Vitals are stable on arrival to the ER    CT chest without contrast reveals Interval increase in the interstitial lung disease and fibrosis.  Patchy nonspecific ground-glass infiltrates.  Differential considerations include infectious, inflammatory, and neoplastic etiologies.  Follow-up recommended. Mild compression fracture upper thoracic spine, age indeterminate. 1 view of the pelvis reveals no acute fracture    CT of the head without contrast and CT of the spine without contrast Chronic small vessel ischemic disease.  Remote encephalomalacia right  occipital lobe. Moderate To severe degenerate changes cervical spine.  No acute fracture traumatic malalignment. CT abdomen and pelvis without contrast reveals no acute traumatic sequela    In the ER she was given 2 doses of fentanyl, 324 mg of chewable aspirin and a 250 mL fluid bolus. Past Medical History:     Past Medical History:   Diagnosis Date    Smallwood's esophagus     Chronic diastolic congestive heart failure ([address] 1/25/2020    Chronic kidney disease     Essential hypertension     Essential hypertension     Gout     Hypertension     Membranous glomerulonephritis     Osteoarthritis     Proteinuria     Unspecified sleep apnea         Past Surgical History:     Past Surgical History:   Procedure Laterality Date    APPENDECTOMY      JOINT REPLACEMENT      TONSILLECTOMY          Medications Prior to Admission:     Prior to Admission medications    Medication Sig Start Date End Date Taking?  Authorizing Provider   oxyCODONE-acetaminophen (PERCOCET) 5-325 Heart Disease Father     Cancer Paternal Grandmother        Review of Systems:     Positive and Negative as described in HPI. Review of Systems   Constitutional: Positive for activity change. Negative for chills, diaphoresis and fever. HENT: Negative for congestion and hearing loss. Respiratory: Negative for cough, shortness of breath, wheezing and stridor. Cardiovascular: Positive for chest pain. Negative for palpitations and leg swelling. Gastrointestinal: Negative for abdominal pain, blood in stool, constipation, diarrhea, nausea and vomiting. Genitourinary: Negative for dysuria and frequency. Musculoskeletal: Positive for back pain and neck pain. Negative for myalgias. Skin: Negative for rash. Neurological: Negative for dizziness, seizures and headaches. Psychiatric/Behavioral: The patient is not nervous/anxious. Physical Exam:   /73   Pulse 91   Temp 97.9 °F (36.6 °C) (Oral)   Resp 18   Ht 5' 4\" (1.626 m)   Wt 184 lb (83.5 kg)   SpO2 96%   BMI 31.58 kg/m²   Temp (24hrs), Av °F (36.7 °C), Min:97.6 °F (36.4 °C), Max:98.6 °F (37 °C)    No results for input(s): POCGLU in the last 72 hours. Intake/Output Summary (Last 24 hours) at 2020 0153  Last data filed at 2020 0015  Gross per 24 hour   Intake --   Output 100 ml   Net -100 ml       Physical Exam  HENT:      Head: Normocephalic and atraumatic. Mouth/Throat:      Mouth: Mucous membranes are moist.   Eyes:      Extraocular Movements: Extraocular movements intact. Conjunctiva/sclera: Conjunctivae normal.      Pupils: Pupils are equal, round, and reactive to light. Neck:      Musculoskeletal: Muscular tenderness present. Cardiovascular:      Rate and Rhythm: Normal rate and regular rhythm. Pulses: Normal pulses. Heart sounds: Normal heart sounds.    Pulmonary:      Effort: Pulmonary effort is normal.      Breath sounds: Examination of the right-middle field reveals decreased breath sounds. Examination of the left-middle field reveals decreased breath sounds. Examination of the right-lower field reveals decreased breath sounds. Examination of the left-lower field reveals decreased breath sounds. Decreased breath sounds present. Abdominal:      General: Bowel sounds are normal.      Palpations: Abdomen is soft. Musculoskeletal: Normal range of motion. Right lower leg: No edema. Left lower leg: No edema. Comments: Generalized weakness   Skin:     General: Skin is warm and dry. Capillary Refill: Capillary refill takes less than 2 seconds. Neurological:      Mental Status: She is alert. Mental status is at baseline. GCS: GCS eye subscore is 4. GCS verbal subscore is 5. GCS motor subscore is 6. Comments: She was able to answer my questions related to her history and orientation but she is not sure about her home medications, during my assessment she asked about pain medicine several times.    Psychiatric:         Mood and Affect: Mood normal.         Investigations:      Laboratory Testing:  Recent Results (from the past 24 hour(s))   CBC with DIFF    Collection Time: 01/25/20  7:30 PM   Result Value Ref Range    WBC 10.5 3.5 - 11.3 k/uL    RBC 4.29 3.95 - 5.11 m/uL    Hemoglobin 12.1 11.9 - 15.1 g/dL    Hematocrit 40.7 36.3 - 47.1 %    MCV 94.9 82.6 - 102.9 fL    MCH 28.2 25.2 - 33.5 pg    MCHC 29.7 28.4 - 34.8 g/dL    RDW 17.0 (H) 11.8 - 14.4 %    Platelets 370 (L) 747 - 453 k/uL    MPV 11.7 8.1 - 13.5 fL    NRBC Automated 0.0 0.0 per 100 WBC    Differential Type NOT REPORTED     Seg Neutrophils 75 (H) 36 - 65 %    Lymphocytes 14 (L) 24 - 43 %    Monocytes 8 3 - 12 %    Eosinophils % 1 1 - 4 %    Basophils 1 0 - 2 %    Immature Granulocytes 1 (H) 0 %    Segs Absolute 7.92 1.50 - 8.10 k/uL    Absolute Lymph # 1.49 1.10 - 3.70 k/uL    Absolute Mono # 0.81 0.10 - 1.20 k/uL    Absolute Eos # 0.13 0.00 - 0.44 k/uL    Basophils Absolute 0.05 0.00 - 0.20 k/uL Absolute Immature Granulocyte 0.09 0.00 - 0.30 k/uL    WBC Morphology NOT REPORTED     RBC Morphology ANISOCYTOSIS PRESENT     Platelet Estimate NOT REPORTED    Basic Metabolic Prof    Collection Time: 01/25/20  7:30 PM   Result Value Ref Range    Glucose 118 (H) 70 - 99 mg/dL    BUN 24 (H) 8 - 23 mg/dL    CREATININE 1.12 (H) 0.50 - 0.90 mg/dL    Bun/Cre Ratio 21 (H) 9 - 20    Calcium 9.7 8.6 - 10.4 mg/dL    Sodium 139 135 - 144 mmol/L    Potassium 4.4 3.7 - 5.3 mmol/L    Chloride 107 98 - 107 mmol/L    CO2 21 20 - 31 mmol/L    Anion Gap 11 9 - 17 mmol/L    GFR Non-African American 46 (L) >60 mL/min    GFR  55 (L) >60 mL/min    GFR Comment          GFR Staging NOT REPORTED    Trop/Myoglobin    Collection Time: 01/25/20  7:30 PM   Result Value Ref Range    Troponin, High Sensitivity 30 (H) 0 - 14 ng/L    Troponin T NOT REPORTED <0.03 ng/mL    Troponin Interp NOT REPORTED     Myoglobin 296 (H) 25 - 58 ng/mL   CK    Collection Time: 01/25/20  7:30 PM   Result Value Ref Range    Total  26 - 192 U/L   Brain Natriuretic Peptide    Collection Time: 01/25/20  7:30 PM   Result Value Ref Range    Pro- (H) <300 pg/mL    BNP Interpretation Pro-BNP Reference Range:    Magnesium    Collection Time: 01/25/20  7:30 PM   Result Value Ref Range    Magnesium 2.1 1.6 - 2.6 mg/dL   Trop/Myoglobin    Collection Time: 01/25/20  9:15 PM   Result Value Ref Range    Troponin, High Sensitivity 30 (H) 0 - 14 ng/L    Troponin T NOT REPORTED <0.03 ng/mL    Troponin Interp NOT REPORTED     Myoglobin 277 (H) 25 - 58 ng/mL   CK    Collection Time: 01/25/20  9:15 PM   Result Value Ref Range    Total  26 - 192 U/L   Troponin    Collection Time: 01/25/20 11:26 PM   Result Value Ref Range    Troponin, High Sensitivity 31 (H) 0 - 14 ng/L    Troponin T NOT REPORTED <0.03 ng/mL    Troponin Interp NOT REPORTED        Imaging/Diagnostics:  Ct Abdomen Pelvis Wo Contrast Additional Contrast? None    Result Date: 1/25/2020  No acute traumatic sequelae. Incidental findings as above. Xr Pelvis (1-2 Views)    Result Date: 1/25/2020  No fracture identified but sensitivity limited due to demineralization. Ct Head Wo Contrast    Result Date: 1/25/2020  Chronic small vessel ischemic disease. Remote encephalomalacia right occipital lobe. Moderate To severe degenerate changes cervical spine. No acute fracture traumatic malalignment. Ct Chest Wo Contrast    Result Date: 1/25/2020  Interval increase in the interstitial lung disease and fibrosis. Patchy nonspecific ground-glass infiltrates. Differential considerations include infectious, inflammatory, and neoplastic etiologies. Follow-up recommended. Mild compression fracture upper thoracic spine, age indeterminate. Ct Cervical Spine Wo Contrast    Result Date: 1/25/2020  Chronic small vessel ischemic disease. Remote encephalomalacia right occipital lobe. Moderate To severe degenerate changes cervical spine. No acute fracture traumatic malalignment. Xr Chest Portable    Result Date: 1/25/2020  Mild edema versus interstitial lung disease. Assessment :      Hospital Problems           Last Modified POA    * (Principal) Chest pain 1/26/2020 Yes    CKD (chronic kidney disease) stage 3, GFR 30-59 ml/min ([address] 1/25/2020 Yes    Fall at home, initial encounter 1/25/2020 Yes    Smallwood's esophagus (Chronic) 1/25/2020 Yes    Essential hypertension 1/25/2020 Yes    Troponin I above reference range 1/25/2020 Yes    Chronic diastolic congestive heart failure ([address] 1/25/2020 Yes    Other specified hypothyroidism 1/25/2020 Yes    Cardiac enzymes elevated 1/25/2020 Yes          Plan:     Patient status inpatient in the  Med/Surge    1. Resume home medications  2. Aspirin 81 mg daily. Continue Pravachol  3. Lovenox for DVT prophylaxis  4. Nitroglycerin sl for chest pain  5. Zofran for nausea  6. Percocet as needed for pain  7.  CBC, CMP, lipid panel, magnesium  8. Continue to trend troponins  9. Cardiac no caffeine diet  10. Echo in a.m. 11. Monitor vitals and telemetry  12. Bed alarm on  13. Oxygen as needed  14. EKG in a.m.  15. Plan discussed with patient her  and RN    Consultations:   IP CONSULT TO INTERNAL MEDICINE     Patient is admitted as inpatient status because of co-morbidities listed above, severity of signs and symptoms as outlined, requirement for current medical therapies and most importantly because of direct risk to patient if care not provided in a hospital setting.     LYDIA Lin CNP  1/26/2020  1:53 AM    Copy sent to Dr. Lena Pelayo